# Patient Record
Sex: FEMALE | Race: WHITE | Employment: OTHER | URBAN - METROPOLITAN AREA
[De-identification: names, ages, dates, MRNs, and addresses within clinical notes are randomized per-mention and may not be internally consistent; named-entity substitution may affect disease eponyms.]

---

## 2018-02-01 LAB
ALT SERPL-CCNC: 25 U/L
AST SERPL-CCNC: 20 U/L
BUN BLDV-MCNC: 16 MG/DL
CALCIUM SERPL-MCNC: 9.5 MG/DL
CHLORIDE BLD-SCNC: 101 MMOL/L
CHOLESTEROL, TOTAL: 213 MG/DL
CHOLESTEROL/HDL RATIO: 4.7
CO2: 101 MMOL/L
CREAT SERPL-MCNC: 0.68 MG/DL
GFR CALCULATED: >60
GLUCOSE BLD-MCNC: 103 MG/DL
HDLC SERPL-MCNC: 45 MG/DL (ref 35–70)
LDL CHOLESTEROL CALCULATED: 134 MG/DL (ref 0–160)
POTASSIUM SERPL-SCNC: 4.9 MMOL/L
SODIUM BLD-SCNC: 141 MMOL/L
TRIGL SERPL-MCNC: 168 MG/DL
TSH SERPL DL<=0.05 MIU/L-ACNC: 8 UIU/ML
VLDLC SERPL CALC-MCNC: NORMAL MG/DL

## 2018-02-12 ENCOUNTER — OFFICE VISIT (OUTPATIENT)
Dept: FAMILY MEDICINE CLINIC | Age: 68
End: 2018-02-12
Payer: MEDICARE

## 2018-02-12 VITALS
WEIGHT: 249 LBS | TEMPERATURE: 98.1 F | BODY MASS INDEX: 39.08 KG/M2 | SYSTOLIC BLOOD PRESSURE: 144 MMHG | OXYGEN SATURATION: 96 % | HEART RATE: 80 BPM | HEIGHT: 67 IN | DIASTOLIC BLOOD PRESSURE: 70 MMHG

## 2018-02-12 DIAGNOSIS — M75.101 TEAR OF RIGHT ROTATOR CUFF, UNSPECIFIED TEAR EXTENT: ICD-10-CM

## 2018-02-12 DIAGNOSIS — L98.9 PAINFUL SKIN LESION: ICD-10-CM

## 2018-02-12 DIAGNOSIS — B07.9 VIRAL WARTS, UNSPECIFIED TYPE: ICD-10-CM

## 2018-02-12 DIAGNOSIS — E78.2 MIXED HYPERLIPIDEMIA: Primary | ICD-10-CM

## 2018-02-12 DIAGNOSIS — E03.9 HYPOTHYROIDISM, UNSPECIFIED TYPE: ICD-10-CM

## 2018-02-12 PROCEDURE — 1123F ACP DISCUSS/DSCN MKR DOCD: CPT | Performed by: NURSE PRACTITIONER

## 2018-02-12 PROCEDURE — G8400 PT W/DXA NO RESULTS DOC: HCPCS | Performed by: NURSE PRACTITIONER

## 2018-02-12 PROCEDURE — 4040F PNEUMOC VAC/ADMIN/RCVD: CPT | Performed by: NURSE PRACTITIONER

## 2018-02-12 PROCEDURE — 4004F PT TOBACCO SCREEN RCVD TLK: CPT | Performed by: NURSE PRACTITIONER

## 2018-02-12 PROCEDURE — G8427 DOCREV CUR MEDS BY ELIG CLIN: HCPCS | Performed by: NURSE PRACTITIONER

## 2018-02-12 PROCEDURE — 3017F COLORECTAL CA SCREEN DOC REV: CPT | Performed by: NURSE PRACTITIONER

## 2018-02-12 PROCEDURE — 99203 OFFICE O/P NEW LOW 30 MIN: CPT | Performed by: NURSE PRACTITIONER

## 2018-02-12 PROCEDURE — G8417 CALC BMI ABV UP PARAM F/U: HCPCS | Performed by: NURSE PRACTITIONER

## 2018-02-12 PROCEDURE — 1090F PRES/ABSN URINE INCON ASSESS: CPT | Performed by: NURSE PRACTITIONER

## 2018-02-12 PROCEDURE — G8484 FLU IMMUNIZE NO ADMIN: HCPCS | Performed by: NURSE PRACTITIONER

## 2018-02-12 PROCEDURE — 3014F SCREEN MAMMO DOC REV: CPT | Performed by: NURSE PRACTITIONER

## 2018-02-12 RX ORDER — LEVOTHYROXINE SODIUM 0.05 MG/1
50 TABLET ORAL DAILY
Qty: 30 TABLET | Refills: 3 | Status: SHIPPED | OUTPATIENT
Start: 2018-02-12 | End: 2018-05-14 | Stop reason: SDUPTHER

## 2018-02-12 RX ORDER — ATORVASTATIN CALCIUM 10 MG/1
10 TABLET, FILM COATED ORAL DAILY
Qty: 90 TABLET | Refills: 1 | Status: SHIPPED | OUTPATIENT
Start: 2018-02-12 | End: 2018-05-14 | Stop reason: SDUPTHER

## 2018-02-12 ASSESSMENT — PATIENT HEALTH QUESTIONNAIRE - PHQ9
SUM OF ALL RESPONSES TO PHQ9 QUESTIONS 1 & 2: 0
1. LITTLE INTEREST OR PLEASURE IN DOING THINGS: 0
2. FEELING DOWN, DEPRESSED OR HOPELESS: 0
SUM OF ALL RESPONSES TO PHQ QUESTIONS 1-9: 0

## 2018-02-13 ASSESSMENT — ENCOUNTER SYMPTOMS
BLURRED VISION: 0
VOMITING: 0
NAUSEA: 0
DIARRHEA: 0
DOUBLE VISION: 0
ABDOMINAL PAIN: 0
BACK PAIN: 0
CONSTIPATION: 0
BLOOD IN STOOL: 0
SORE THROAT: 0
ORTHOPNEA: 0
SHORTNESS OF BREATH: 0
COUGH: 0
HEARTBURN: 0

## 2018-03-01 ENCOUNTER — TELEPHONE (OUTPATIENT)
Dept: FAMILY MEDICINE CLINIC | Age: 68
End: 2018-03-01

## 2018-03-01 DIAGNOSIS — R42 VERTIGO: Primary | ICD-10-CM

## 2018-05-03 LAB
BUN BLDV-MCNC: 14 MG/DL
CALCIUM SERPL-MCNC: 9.2 MG/DL
CHLORIDE BLD-SCNC: 104 MMOL/L
CHOLESTEROL, TOTAL: 151 MG/DL
CHOLESTEROL/HDL RATIO: 3.2
CO2: 23 MMOL/L
CREAT SERPL-MCNC: 0.63 MG/DL
GFR CALCULATED: NORMAL
GLUCOSE BLD-MCNC: 104 MG/DL
HDLC SERPL-MCNC: 47 MG/DL (ref 35–70)
LDL CHOLESTEROL CALCULATED: 88 MG/DL (ref 0–160)
POTASSIUM SERPL-SCNC: 4.5 MMOL/L
SODIUM BLD-SCNC: 143 MMOL/L
TRIGL SERPL-MCNC: 80 MG/DL
TSH SERPL DL<=0.05 MIU/L-ACNC: 4.62 UIU/ML
VLDLC SERPL CALC-MCNC: NORMAL MG/DL

## 2018-05-14 ENCOUNTER — OFFICE VISIT (OUTPATIENT)
Dept: FAMILY MEDICINE CLINIC | Age: 68
End: 2018-05-14
Payer: MEDICARE

## 2018-05-14 VITALS
WEIGHT: 255 LBS | HEIGHT: 67 IN | HEART RATE: 83 BPM | DIASTOLIC BLOOD PRESSURE: 76 MMHG | SYSTOLIC BLOOD PRESSURE: 130 MMHG | BODY MASS INDEX: 40.02 KG/M2 | OXYGEN SATURATION: 94 %

## 2018-05-14 DIAGNOSIS — E78.2 MIXED HYPERLIPIDEMIA: Primary | ICD-10-CM

## 2018-05-14 DIAGNOSIS — E03.9 HYPOTHYROIDISM, UNSPECIFIED TYPE: ICD-10-CM

## 2018-05-14 PROBLEM — E05.90 HYPERTHYROIDISM: Status: ACTIVE | Noted: 2018-05-14

## 2018-05-14 PROCEDURE — G8417 CALC BMI ABV UP PARAM F/U: HCPCS | Performed by: NURSE PRACTITIONER

## 2018-05-14 PROCEDURE — G8400 PT W/DXA NO RESULTS DOC: HCPCS | Performed by: NURSE PRACTITIONER

## 2018-05-14 PROCEDURE — G8427 DOCREV CUR MEDS BY ELIG CLIN: HCPCS | Performed by: NURSE PRACTITIONER

## 2018-05-14 PROCEDURE — 1090F PRES/ABSN URINE INCON ASSESS: CPT | Performed by: NURSE PRACTITIONER

## 2018-05-14 PROCEDURE — 1123F ACP DISCUSS/DSCN MKR DOCD: CPT | Performed by: NURSE PRACTITIONER

## 2018-05-14 PROCEDURE — 3017F COLORECTAL CA SCREEN DOC REV: CPT | Performed by: NURSE PRACTITIONER

## 2018-05-14 PROCEDURE — 99214 OFFICE O/P EST MOD 30 MIN: CPT | Performed by: NURSE PRACTITIONER

## 2018-05-14 PROCEDURE — 4040F PNEUMOC VAC/ADMIN/RCVD: CPT | Performed by: NURSE PRACTITIONER

## 2018-05-14 PROCEDURE — 4004F PT TOBACCO SCREEN RCVD TLK: CPT | Performed by: NURSE PRACTITIONER

## 2018-05-14 RX ORDER — ATORVASTATIN CALCIUM 10 MG/1
10 TABLET, FILM COATED ORAL DAILY
Qty: 90 TABLET | Refills: 1 | Status: SHIPPED | OUTPATIENT
Start: 2018-05-14 | End: 2018-09-12 | Stop reason: SDUPTHER

## 2018-05-14 RX ORDER — LEVOTHYROXINE SODIUM 0.05 MG/1
50 TABLET ORAL DAILY
Qty: 90 TABLET | Refills: 1 | Status: SHIPPED | OUTPATIENT
Start: 2018-05-14 | End: 2018-09-12 | Stop reason: SDUPTHER

## 2018-05-17 ASSESSMENT — ENCOUNTER SYMPTOMS
NAUSEA: 0
COUGH: 0
DIARRHEA: 0
SHORTNESS OF BREATH: 0
VOMITING: 0

## 2018-09-12 DIAGNOSIS — E78.2 MIXED HYPERLIPIDEMIA: ICD-10-CM

## 2018-09-12 RX ORDER — LEVOTHYROXINE SODIUM 0.05 MG/1
TABLET ORAL
Qty: 90 TABLET | Refills: 1 | Status: SHIPPED | OUTPATIENT
Start: 2018-09-12 | End: 2018-11-12 | Stop reason: SDUPTHER

## 2018-09-12 RX ORDER — ATORVASTATIN CALCIUM 10 MG/1
TABLET, FILM COATED ORAL
Qty: 90 TABLET | Refills: 1 | Status: SHIPPED | OUTPATIENT
Start: 2018-09-12 | End: 2018-11-12 | Stop reason: SDUPTHER

## 2018-09-22 ENCOUNTER — APPOINTMENT (OUTPATIENT)
Dept: ULTRASOUND IMAGING | Age: 68
End: 2018-09-22
Payer: MEDICARE

## 2018-09-22 ENCOUNTER — APPOINTMENT (OUTPATIENT)
Dept: GENERAL RADIOLOGY | Age: 68
End: 2018-09-22
Payer: MEDICARE

## 2018-09-22 ENCOUNTER — APPOINTMENT (OUTPATIENT)
Dept: CT IMAGING | Age: 68
End: 2018-09-22
Payer: MEDICARE

## 2018-09-22 ENCOUNTER — HOSPITAL ENCOUNTER (EMERGENCY)
Age: 68
Discharge: ANOTHER HOSPITAL | End: 2018-09-23
Attending: INTERNAL MEDICINE
Payer: MEDICARE

## 2018-09-22 DIAGNOSIS — Z72.0 TOBACCO ABUSE: ICD-10-CM

## 2018-09-22 DIAGNOSIS — E86.0 DEHYDRATION: ICD-10-CM

## 2018-09-22 DIAGNOSIS — I10 ESSENTIAL HYPERTENSION: ICD-10-CM

## 2018-09-22 DIAGNOSIS — N39.0 URINARY TRACT INFECTION WITH HEMATURIA, SITE UNSPECIFIED: ICD-10-CM

## 2018-09-22 DIAGNOSIS — K35.80 ACUTE APPENDICITIS, UNSPECIFIED ACUTE APPENDICITIS TYPE: Primary | ICD-10-CM

## 2018-09-22 DIAGNOSIS — R31.9 URINARY TRACT INFECTION WITH HEMATURIA, SITE UNSPECIFIED: ICD-10-CM

## 2018-09-22 LAB
-: ABNORMAL
ABSOLUTE EOS #: 0.1 K/UL (ref 0–0.4)
ABSOLUTE IMMATURE GRANULOCYTE: ABNORMAL K/UL (ref 0–0.3)
ABSOLUTE LYMPH #: 1.1 K/UL (ref 1–4.8)
ABSOLUTE MONO #: 0.3 K/UL (ref 0–1)
ALBUMIN SERPL-MCNC: 4.1 G/DL (ref 3.5–5.2)
ALBUMIN/GLOBULIN RATIO: ABNORMAL (ref 1–2.5)
ALP BLD-CCNC: 76 U/L (ref 35–104)
ALT SERPL-CCNC: 21 U/L (ref 5–33)
AMORPHOUS: ABNORMAL
AMYLASE: 44 U/L (ref 28–100)
ANION GAP SERPL CALCULATED.3IONS-SCNC: 13 MMOL/L (ref 9–17)
AST SERPL-CCNC: 18 U/L
BACTERIA: ABNORMAL
BASOPHILS # BLD: 0 % (ref 0–2)
BASOPHILS ABSOLUTE: 0 K/UL (ref 0–0.2)
BILIRUB SERPL-MCNC: 0.73 MG/DL (ref 0.3–1.2)
BILIRUBIN URINE: NEGATIVE
BUN BLDV-MCNC: 16 MG/DL (ref 8–23)
BUN/CREAT BLD: 25 (ref 9–20)
CALCIUM SERPL-MCNC: 9 MG/DL (ref 8.6–10.4)
CASTS UA: ABNORMAL /LPF
CHLORIDE BLD-SCNC: 104 MMOL/L (ref 98–107)
CO2: 25 MMOL/L (ref 20–31)
COLOR: YELLOW
COMMENT UA: ABNORMAL
CREAT SERPL-MCNC: 0.64 MG/DL (ref 0.5–0.9)
CRYSTALS, UA: ABNORMAL /HPF
DIFFERENTIAL TYPE: YES
DIRECT EXAM: ABNORMAL
EOSINOPHILS RELATIVE PERCENT: 1 % (ref 0–5)
EPITHELIAL CELLS UA: ABNORMAL /HPF
GFR AFRICAN AMERICAN: >60 ML/MIN
GFR NON-AFRICAN AMERICAN: >60 ML/MIN
GFR SERPL CREATININE-BSD FRML MDRD: ABNORMAL ML/MIN/{1.73_M2}
GFR SERPL CREATININE-BSD FRML MDRD: ABNORMAL ML/MIN/{1.73_M2}
GLUCOSE BLD-MCNC: 118 MG/DL (ref 70–99)
GLUCOSE URINE: NEGATIVE
HCT VFR BLD CALC: 40.6 % (ref 36–46)
HEMOGLOBIN: 13.6 G/DL (ref 12–16)
IMMATURE GRANULOCYTES: ABNORMAL %
KETONES, URINE: ABNORMAL
LACTIC ACID, SEPSIS WHOLE BLOOD: NORMAL MMOL/L (ref 0.5–1.9)
LACTIC ACID, SEPSIS: 1.5 MMOL/L (ref 0.5–1.9)
LEUKOCYTE ESTERASE, URINE: ABNORMAL
LIPASE: 17 U/L (ref 13–60)
LYMPHOCYTES # BLD: 9 % (ref 15–40)
Lab: ABNORMAL
MCH RBC QN AUTO: 30.8 PG (ref 26–34)
MCHC RBC AUTO-ENTMCNC: 33.5 G/DL (ref 31–37)
MCV RBC AUTO: 91.8 FL (ref 80–100)
MONOCYTES # BLD: 2 % (ref 4–8)
MUCUS: ABNORMAL
NITRITE, URINE: NEGATIVE
NRBC AUTOMATED: ABNORMAL PER 100 WBC
OTHER OBSERVATIONS UA: ABNORMAL
PDW BLD-RTO: 14.1 % (ref 12.1–15.2)
PH UA: 5 (ref 5–8)
PLATELET # BLD: 308 K/UL (ref 140–450)
PLATELET ESTIMATE: ABNORMAL
PMV BLD AUTO: ABNORMAL FL (ref 6–12)
POTASSIUM SERPL-SCNC: 3.6 MMOL/L (ref 3.7–5.3)
PROTEIN UA: ABNORMAL
RBC # BLD: 4.43 M/UL (ref 4–5.2)
RBC # BLD: ABNORMAL 10*6/UL
RBC UA: ABNORMAL /HPF (ref 0–2)
RENAL EPITHELIAL, UA: ABNORMAL /HPF
SEG NEUTROPHILS: 88 % (ref 47–75)
SEGMENTED NEUTROPHILS ABSOLUTE COUNT: 10.6 K/UL (ref 2.5–7)
SODIUM BLD-SCNC: 142 MMOL/L (ref 135–144)
SPECIFIC GRAVITY UA: 1.02 (ref 1–1.03)
SPECIMEN DESCRIPTION: ABNORMAL
STATUS: ABNORMAL
TOTAL PROTEIN: 7.3 G/DL (ref 6.4–8.3)
TRICHOMONAS: ABNORMAL
TURBIDITY: ABNORMAL
URINE HGB: ABNORMAL
UROBILINOGEN, URINE: NORMAL
WBC # BLD: 12.1 K/UL (ref 3.5–11)
WBC # BLD: ABNORMAL 10*3/UL
WBC UA: ABNORMAL /HPF
YEAST: ABNORMAL

## 2018-09-22 PROCEDURE — 87015 SPECIMEN INFECT AGNT CONCNTJ: CPT

## 2018-09-22 PROCEDURE — 6360000004 HC RX CONTRAST MEDICATION: Performed by: INTERNAL MEDICINE

## 2018-09-22 PROCEDURE — 99285 EMERGENCY DEPT VISIT HI MDM: CPT

## 2018-09-22 PROCEDURE — 96365 THER/PROPH/DIAG IV INF INIT: CPT

## 2018-09-22 PROCEDURE — 71045 X-RAY EXAM CHEST 1 VIEW: CPT

## 2018-09-22 PROCEDURE — 6370000000 HC RX 637 (ALT 250 FOR IP): Performed by: INTERNAL MEDICINE

## 2018-09-22 PROCEDURE — 80053 COMPREHEN METABOLIC PANEL: CPT

## 2018-09-22 PROCEDURE — 85025 COMPLETE CBC W/AUTO DIFF WBC: CPT

## 2018-09-22 PROCEDURE — 87040 BLOOD CULTURE FOR BACTERIA: CPT

## 2018-09-22 PROCEDURE — 87252 VIRUS INOCULATION TISSUE: CPT

## 2018-09-22 PROCEDURE — 96361 HYDRATE IV INFUSION ADD-ON: CPT

## 2018-09-22 PROCEDURE — 94664 DEMO&/EVAL PT USE INHALER: CPT

## 2018-09-22 PROCEDURE — 81001 URINALYSIS AUTO W/SCOPE: CPT

## 2018-09-22 PROCEDURE — 94640 AIRWAY INHALATION TREATMENT: CPT

## 2018-09-22 PROCEDURE — 87210 SMEAR WET MOUNT SALINE/INK: CPT

## 2018-09-22 PROCEDURE — 6360000002 HC RX W HCPCS: Performed by: INTERNAL MEDICINE

## 2018-09-22 PROCEDURE — 82150 ASSAY OF AMYLASE: CPT

## 2018-09-22 PROCEDURE — 2580000003 HC RX 258: Performed by: INTERNAL MEDICINE

## 2018-09-22 PROCEDURE — 83690 ASSAY OF LIPASE: CPT

## 2018-09-22 PROCEDURE — 87255 GENET VIRUS ISOLATE HSV: CPT

## 2018-09-22 PROCEDURE — 83605 ASSAY OF LACTIC ACID: CPT

## 2018-09-22 PROCEDURE — 36415 COLL VENOUS BLD VENIPUNCTURE: CPT

## 2018-09-22 PROCEDURE — 87591 N.GONORRHOEAE DNA AMP PROB: CPT

## 2018-09-22 PROCEDURE — 74177 CT ABD & PELVIS W/CONTRAST: CPT

## 2018-09-22 PROCEDURE — 87086 URINE CULTURE/COLONY COUNT: CPT

## 2018-09-22 RX ORDER — IPRATROPIUM BROMIDE AND ALBUTEROL SULFATE 2.5; .5 MG/3ML; MG/3ML
1 SOLUTION RESPIRATORY (INHALATION) ONCE
Status: COMPLETED | OUTPATIENT
Start: 2018-09-22 | End: 2018-09-22

## 2018-09-22 RX ORDER — ACETAMINOPHEN 325 MG/1
650 TABLET ORAL ONCE
Status: COMPLETED | OUTPATIENT
Start: 2018-09-22 | End: 2018-09-22

## 2018-09-22 RX ORDER — 0.9 % SODIUM CHLORIDE 0.9 %
1000 INTRAVENOUS SOLUTION INTRAVENOUS ONCE
Status: COMPLETED | OUTPATIENT
Start: 2018-09-22 | End: 2018-09-22

## 2018-09-22 RX ADMIN — PIPERACILLIN SODIUM, TAZOBACTAM SODIUM 3.38 G: 3; .375 INJECTION, POWDER, LYOPHILIZED, FOR SOLUTION INTRAVENOUS at 22:46

## 2018-09-22 RX ADMIN — IOPAMIDOL 75 ML: 755 INJECTION, SOLUTION INTRAVENOUS at 20:02

## 2018-09-22 RX ADMIN — IPRATROPIUM BROMIDE AND ALBUTEROL SULFATE 1 AMPULE: .5; 3 SOLUTION RESPIRATORY (INHALATION) at 23:38

## 2018-09-22 RX ADMIN — ACETAMINOPHEN 650 MG: 325 TABLET, FILM COATED ORAL at 22:45

## 2018-09-22 RX ADMIN — SODIUM CHLORIDE 1000 ML: 9 INJECTION, SOLUTION INTRAVENOUS at 22:03

## 2018-09-22 ASSESSMENT — PAIN DESCRIPTION - PAIN TYPE: TYPE: ACUTE PAIN

## 2018-09-22 ASSESSMENT — PAIN SCALES - GENERAL
PAINLEVEL_OUTOF10: 5
PAINLEVEL_OUTOF10: 3

## 2018-09-22 ASSESSMENT — PAIN DESCRIPTION - ORIENTATION: ORIENTATION: LOWER

## 2018-09-22 ASSESSMENT — PAIN DESCRIPTION - DESCRIPTORS: DESCRIPTORS: PRESSURE

## 2018-09-22 ASSESSMENT — PAIN DESCRIPTION - LOCATION: LOCATION: ABDOMEN

## 2018-09-22 NOTE — ED PROVIDER NOTES
[facility] ED  eMERGENCY dEPARTMENT eNCOUnter      Pt Name: Dat Singletary  MRN: [id number]  [place] 1950  Date of evaluation: 9/22/2018  Provider: Yocasta Hoffmann MD    [address] [place] [address]       Chief Complaint   Patient presents with    Abdominal Pain     lower abd pain started this am         HISTORY OF PRESENT ILLNESS   (Location/Symptom, Timing/Onset, Context/Setting, Quality, Duration, Modifying Factors, Severity)  Note limiting factors. Dat Singletary is a 78 y.o. female who presents to the emergency department For evaluation and management of lower abdominal pain. This all started today and it is described as crampy. There is no vomiting or diarrhea. She has had intermittent nausea. Her last meal was one day ago. She had some sense of water on her way here. Patient reports that she had a hysterectomy performed in 2006 with mesh placement for bladder suspension. She  has had irritating vaginal discharge that syarted one year agoBut has not seen a gynecologist for it. A colonoscopy was performed 10 years ago and polyps were removed. She has had a foul vaginal discharge for one year causing a rash in her perineal region. Last sensation of a gynecologist was 4 years ago. She has not been seen by any other providers for this. HPI    Nursing Notes were reviewed. REVIEW OF SYSTEMS    (2-9 systems for level 4, 10 or more for level 5)       REVIEW OF SYSTEMS    Constitutional: Negative for fatigue and fever. Respiratory: Negative for cough, chest tightness and shortness of breath. Cardiovascular: Negative for chest pain, palpitations and leg swelling. Gastrointestinal: Positive for Crampy low abdominal pain,, Negative for abdominal distention, diarrhea, nausea and vomiting. Endocrine: Negative for cold intolerance, heat intolerance, polydipsia, polyphagia and polyuria. Genitourinary: Positive for one year of vaginal discharge, tender rash in the perineum.  Negative for difficulty PHYSICAL EXAM    (up to 7 for level 4, 8 or more for level 5)     ED Triage Vitals   BP Temp Temp src Pulse Resp SpO2 Height Weight   -- -- -- -- -- -- -- --       Physical Exam  Physical Exam   Constitutional:  Appears Well, well-developed and well-nourished. No distress noted. Non toxic in appearance. Morbidly obese. HENT:     Head: Normocephalic and atraumatic. Right Ear: External ear normal.    Left Ear: External ear normal.    Nose: Nose normal.     Mouth/Throat: Oropharynx is clear and mucosa moist. No oropharyngeal exudate noted. Eyes: Conjunctivae and EOM are normal. Pupils are equal, round, and reactive to light. No scleral icterus. Neck: Normal range of motion. Neck supple. No tracheal deviation present. Cardiovascular: Normal rate, regular rhythm, normal heart sounds and intact distal pulses. Exam reveals no gallop or friction rub. No murmur heard. Pulmonary/Chest: Effort normal and breath sounds are symmetric. No respiratory distress. There are diffuse wheezes, and rhonchi in both lungs but no rales. No tenderness is exhibited upon palpation of the chest wall. Abdominal: Soft. Bowel sounds are diminished. No distension or no mass exhibitted. There is diffuse mild to moderate lower abdominal tenderness, without rebound, rigidity or guarding. Genitourinary:   No CVA tenderness external genitalia demonstrates a palpable papular rash with central clearing. There is a thick white discharge in the vaginal vault. There are hard gray foreign bodies identified in the posterior vaginal vault bilaterally. Musculoskeletal: Normal range of motion. No edema, tenderness or deformity. Lymphadenopathy:  No cervical adenopathy. Neurological:   alert and oriented to person, place, and time. Reflexes are normal.  There are no cranial nerve deficits. Normal muscle tone, motor and sensory function exhibitted. Coordination and gait normal.   Skin: Skin is warm and dry. No rash noted.  No diaphoresis. No erythema. No pallor. Psychiatric:  Pleasant and cooperative, normal mood and affect. Behavior is  normal. Judgment and thought content normal.     DIAGNOSTIC RESULTS     EKG: All EKG's are interpreted by the Emergency Department Physician who either signs or Co-signs this chart in the absence of a cardiologist.    Not indicated    RADIOLOGY:   Non-plain film images such as CT, Ultrasound and MRI are read by the radiologist.     Interpretation per the Radiologist below, if available at the time of this note:    CT ABDOMEN PELVIS W IV CONTRAST Additional Contrast? None   Final Result      1. Retrocecal appendix with acute appendicitis. The appendix measures up to 1.2    cm in diameter with surrounding fat inflammation. No signs of perforation or    abscess. A trace volume of pelvic free fluid is likely reactive. 2. Mild hepatomegaly with moderate diffuse steatosis. 3. Moderate left colonic diverticulosis without signs of acute diverticulitis. 4. Possible punctate nonobstructing left renal stone. 5. Hysterectomy. results were called by Dr. Nicole Mc DO  to  Dr. Matias Salazar  At    9/22/2018 9:00 PM EDT. XR CHEST PORTABLE   Final Result      1. Mild bibasilar atelectasis. 2. Otherwise no acute cardiopulmonary process.                   ED BEDSIDE ULTRASOUND:   Performed by ED Physician - none    LABS:  Labs Reviewed   WET PREP, GENITAL - Abnormal; Notable for the following:        Result Value    Direct Exam RARE WBC (*)     Direct Exam RARE EPITHELIAL CELLS (*)     Direct Exam RARE BACTERIA (*)     All other components within normal limits   COMPREHENSIVE METABOLIC PANEL W/ REFLEX TO MG FOR LOW K - Abnormal; Notable for the following:     Glucose 118 (*)     Bun/Cre Ratio 25 (*)     Potassium 3.6 (*)     All other components within normal limits   CBC WITH AUTO DIFFERENTIAL - Abnormal; Notable for the following:     WBC 12.1 (*)     Seg Neutrophils 88 (*)     Lymphocytes 9 (*)     Monocytes 2 (*)     Segs Absolute 10.60 (*)     All other components within normal limits   URINALYSIS - Abnormal; Notable for the following:     Turbidity UA HAZY (*)     Ketones, Urine TRACE (*)     Urine Hgb 2+ (*)     Protein, UA 1+ (*)     Leukocyte Esterase, Urine 3+ (*)     All other components within normal limits   MICROSCOPIC URINALYSIS - Abnormal; Notable for the following:     Bacteria, UA 1+ (*)     Mucus, UA 1+ (*)     All other components within normal limits   NEISSERIA GONORRHOEAE DNA, URINE   URINE CULTURE   CULTURE BLOOD #2   CULTURE BLOOD #1   LIPASE   AMYLASE   LACTATE, SEPSIS   LACTATE, SEPSIS   NON-RESPIRATORY VIRAL CULTURE       All other labs were within normal range or not returned as of this dictation. EMERGENCY DEPARTMENT COURSE and DIFFERENTIAL DIAGNOSIS/MDM:   Vitals:    Vitals:    09/23/18 0140 09/23/18 0149 09/23/18 0204 09/23/18 0257   BP: (!) 125/44 (!) 134/50 (!) 124/50 (!) 123/54   Pulse: 80   77   Resp:    20   Temp: 99 °F (37.2 °C)      TempSrc: Oral      SpO2: 95% 94% 93% 96%   Weight:       Height:           Noted    MDM    CRITICAL CARE TIME   Total Critical Care time was 0 minutes, excluding separately reportable procedures. Tiffani Blount  ED Course as of Sep 23 0540   Sat Sep 22, 2018   2058 Radiologist Dr. Rosy Cheatham contacted me to explain the CT results which included acute appendicitis in the retrocecal position. It is 1.2 cm at the widest dimension. It is fluid-filled with inflammatory stranding. There is no evidence of perforation. There is trace free fluid in the pelvis. [MS]   2110 Call placed to Dr. Tong Jacobsen. I discussed this patient's appendicitis and he will call the house supervisor to discuss the case and plan for surgery. [MS]   2115 I again spoke with Dr. Tong Jacobsen and explained to him this patient's other finding and problem with pelvic mesh.  He stated that he was concerned because he has very limited assistance in [place] due to the complexity of the situation to avoid multiple transfers. I told Kdgerri Croftbri that I would speak with the patient. [MS]   9970 Patient states that she is willing to go to [facility]. [facility] contacted to let them know. [MS]   3213 I spoke with Rachele Kat at the transfer center who spoke with the gynecologist Dr. Amita Christian, who stated that vaginal mesh would be removed as an outpatient procedure. She also spoke with the general surgeon who would gladly accept the patient for transfer for appendectomy. Patient is to be sent to the emergency department, accepting is Dr. Shelly Hall. She would like the chart and CD of images sent over. [MS]   8716 Spoke with Dr. Loulou Collazo who is a general surgeon at [facility] in [place] and she stated that she would gladly accept the patient for an appendectomy but there are no resources available to handle the problem with the mesh. Commended the patient be transferred to another facility with the ability to address this concern. [MS]   Sun Sep 23, 2018   0123 Call placed to [facility] to contact [facility]. [MS]   4604 Pending from the access Center called to state that she contacted was*who stated that the ET was 4:15 this morning. She reported that the ED contacted them at 2359 at which time [facility] was contacted. Angelica are then contacted [facility] to confirm the bed assignment. She had also contacted Medicare, but no other services are available to transport this patient in the Center. She asked if the MICU transport would be appropriate and I stated to her that at this time I believed it was appropriate to initiate MICU transport. [MS]   0131 I spoke to Peg at the access Center again and she stated the [facility] head no MICU available for transport. [MS]   0158 Patient is clinically improved and laboratory vital signs show interval improvement.  We'll continue to monitor closely for any evidence of deterioration at which time she Physician            Yocasta Hoffmann MD  09/23/18 4861

## 2018-09-23 VITALS
WEIGHT: 247.5 LBS | HEIGHT: 67 IN | DIASTOLIC BLOOD PRESSURE: 54 MMHG | SYSTOLIC BLOOD PRESSURE: 123 MMHG | HEART RATE: 77 BPM | RESPIRATION RATE: 20 BRPM | OXYGEN SATURATION: 96 % | TEMPERATURE: 99 F | BODY MASS INDEX: 38.84 KG/M2

## 2018-09-23 LAB
-: NORMAL
CULTURE: NORMAL
LACTIC ACID, SEPSIS WHOLE BLOOD: NORMAL MMOL/L (ref 0.5–1.9)
LACTIC ACID, SEPSIS: 0.9 MMOL/L (ref 0.5–1.9)
Lab: NORMAL
Lab: NORMAL
REASON FOR REJECTION: NORMAL
SPECIMEN DESCRIPTION: NORMAL
SPECIMEN DESCRIPTION: NORMAL
STATUS: NORMAL
STATUS: NORMAL
ZZ NTE CLEAN UP: ORDERED TEST: NORMAL
ZZ NTE WITH NAME CLEAN UP: SPECIMEN SOURCE: NORMAL

## 2018-09-23 PROCEDURE — 83605 ASSAY OF LACTIC ACID: CPT

## 2018-09-23 PROCEDURE — 96361 HYDRATE IV INFUSION ADD-ON: CPT

## 2018-09-23 PROCEDURE — 36415 COLL VENOUS BLD VENIPUNCTURE: CPT

## 2018-09-23 PROCEDURE — 2580000003 HC RX 258: Performed by: INTERNAL MEDICINE

## 2018-09-23 RX ORDER — 0.9 % SODIUM CHLORIDE 0.9 %
1000 INTRAVENOUS SOLUTION INTRAVENOUS ONCE
Status: COMPLETED | OUTPATIENT
Start: 2018-09-23 | End: 2018-09-23

## 2018-09-23 RX ADMIN — SODIUM CHLORIDE 1000 ML: 9 INJECTION, SOLUTION INTRAVENOUS at 01:17

## 2018-09-23 ASSESSMENT — PAIN DESCRIPTION - DESCRIPTORS: DESCRIPTORS: CRAMPING

## 2018-09-23 ASSESSMENT — PAIN DESCRIPTION - PAIN TYPE: TYPE: ACUTE PAIN

## 2018-09-23 ASSESSMENT — PAIN DESCRIPTION - PROGRESSION: CLINICAL_PROGRESSION: NOT CHANGED

## 2018-09-23 ASSESSMENT — PAIN DESCRIPTION - ORIENTATION: ORIENTATION: RIGHT;LOWER

## 2018-09-23 ASSESSMENT — PAIN SCALES - WONG BAKER: WONGBAKER_NUMERICALRESPONSE: 0

## 2018-09-23 ASSESSMENT — PAIN DESCRIPTION - FREQUENCY: FREQUENCY: CONTINUOUS

## 2018-09-23 ASSESSMENT — PAIN SCALES - GENERAL: PAINLEVEL_OUTOF10: 8

## 2018-09-23 ASSESSMENT — PAIN DESCRIPTION - ONSET: ONSET: GRADUAL

## 2018-09-23 ASSESSMENT — PAIN DESCRIPTION - LOCATION: LOCATION: ABDOMEN

## 2018-09-23 NOTE — ED NOTES
Pt and family updated on transfer to [facility]. Pt and family request transfer to [facility] do to location and family issues occurring at this time.       Gianni Reyes RN  09/22/18 2673

## 2018-09-25 LAB — N. GONORRHOEAE DNA, URINE: NEGATIVE

## 2018-09-26 LAB
CULTURE: NORMAL
CULTURE: NORMAL
Lab: NORMAL
SPECIMEN DESCRIPTION: NORMAL
STATUS: NORMAL

## 2018-09-28 LAB
CULTURE: NORMAL
CULTURE: NORMAL
Lab: NORMAL
Lab: NORMAL
SPECIMEN DESCRIPTION: NORMAL
SPECIMEN DESCRIPTION: NORMAL
STATUS: NORMAL
STATUS: NORMAL

## 2018-11-12 ENCOUNTER — OFFICE VISIT (OUTPATIENT)
Dept: FAMILY MEDICINE CLINIC | Age: 68
End: 2018-11-12
Payer: MEDICARE

## 2018-11-12 VITALS
WEIGHT: 246 LBS | OXYGEN SATURATION: 97 % | DIASTOLIC BLOOD PRESSURE: 80 MMHG | TEMPERATURE: 97.8 F | BODY MASS INDEX: 37.28 KG/M2 | SYSTOLIC BLOOD PRESSURE: 136 MMHG | HEART RATE: 76 BPM | HEIGHT: 68 IN

## 2018-11-12 DIAGNOSIS — E78.2 MIXED HYPERLIPIDEMIA: ICD-10-CM

## 2018-11-12 DIAGNOSIS — E03.9 ACQUIRED HYPOTHYROIDISM: ICD-10-CM

## 2018-11-12 PROBLEM — E05.90 HYPERTHYROIDISM: Status: RESOLVED | Noted: 2018-05-14 | Resolved: 2018-11-12

## 2018-11-12 PROCEDURE — 3017F COLORECTAL CA SCREEN DOC REV: CPT | Performed by: NURSE PRACTITIONER

## 2018-11-12 PROCEDURE — 4004F PT TOBACCO SCREEN RCVD TLK: CPT | Performed by: NURSE PRACTITIONER

## 2018-11-12 PROCEDURE — G8427 DOCREV CUR MEDS BY ELIG CLIN: HCPCS | Performed by: NURSE PRACTITIONER

## 2018-11-12 PROCEDURE — 99214 OFFICE O/P EST MOD 30 MIN: CPT | Performed by: NURSE PRACTITIONER

## 2018-11-12 PROCEDURE — G8484 FLU IMMUNIZE NO ADMIN: HCPCS | Performed by: NURSE PRACTITIONER

## 2018-11-12 PROCEDURE — 1090F PRES/ABSN URINE INCON ASSESS: CPT | Performed by: NURSE PRACTITIONER

## 2018-11-12 PROCEDURE — 1101F PT FALLS ASSESS-DOCD LE1/YR: CPT | Performed by: NURSE PRACTITIONER

## 2018-11-12 PROCEDURE — G8417 CALC BMI ABV UP PARAM F/U: HCPCS | Performed by: NURSE PRACTITIONER

## 2018-11-12 PROCEDURE — 4040F PNEUMOC VAC/ADMIN/RCVD: CPT | Performed by: NURSE PRACTITIONER

## 2018-11-12 PROCEDURE — 1123F ACP DISCUSS/DSCN MKR DOCD: CPT | Performed by: NURSE PRACTITIONER

## 2018-11-12 PROCEDURE — G8400 PT W/DXA NO RESULTS DOC: HCPCS | Performed by: NURSE PRACTITIONER

## 2018-11-12 RX ORDER — LEVOTHYROXINE SODIUM 0.05 MG/1
TABLET ORAL
Qty: 90 TABLET | Refills: 1 | Status: SHIPPED | OUTPATIENT
Start: 2018-11-12 | End: 2019-09-04 | Stop reason: SDUPTHER

## 2018-11-12 RX ORDER — ATORVASTATIN CALCIUM 10 MG/1
TABLET, FILM COATED ORAL
Qty: 90 TABLET | Refills: 1 | Status: SHIPPED | OUTPATIENT
Start: 2018-11-12 | End: 2019-09-04 | Stop reason: SDUPTHER

## 2018-11-12 ASSESSMENT — ENCOUNTER SYMPTOMS
SORE THROAT: 0
CONSTIPATION: 0
BACK PAIN: 0
DIARRHEA: 0
BLOOD IN STOOL: 0
COUGH: 0
VOMITING: 0
SHORTNESS OF BREATH: 0
NAUSEA: 0
ABDOMINAL PAIN: 0

## 2018-11-12 NOTE — PROGRESS NOTES
Admission medications    Medication Sig Start Date End Date Taking? Authorizing Provider   levothyroxine (SYNTHROID) 50 MCG tablet TAKE 1 TABLET EVERY DAY 11/12/18  Yes SELENE Peacock CNP   atorvastatin (LIPITOR) 10 MG tablet TAKE 1 TABLET EVERY DAY 11/12/18  Yes SELENE Peacock CNP        Allergies:       Patient has no known allergies. Social History:     Tobacco:    reports that she has been smoking. She has a 30.00 pack-year smoking history. She has never used smokeless tobacco.  Alcohol:      reports that she does not drink alcohol. Drug Use:  reports that she does not use drugs. Family History:     Family History   Problem Relation Age of Onset    Stroke Maternal Grandmother     Stroke Paternal Grandmother        Review of Systems:         Review of Systems   Constitutional: Negative for chills and fever. HENT: Negative for sore throat. Respiratory: Negative for cough and shortness of breath. Cardiovascular: Negative for chest pain, palpitations and leg swelling. Gastrointestinal: Negative for abdominal pain, blood in stool, constipation, diarrhea, nausea and vomiting. Genitourinary: Negative for dysuria, frequency and hematuria. Musculoskeletal: Negative for back pain and neck pain. Skin: Negative for rash. Neurological: Negative for dizziness, tremors, seizures, weakness and headaches. Hematological: Does not bruise/bleed easily. Psychiatric/Behavioral: Negative for suicidal ideas. The patient is not nervous/anxious. Physical Exam:     Vitals:  /80   Pulse 76   Temp 97.8 °F (36.6 °C) (Oral)   Ht 5' 8\" (1.727 m)   Wt 246 lb (111.6 kg)   SpO2 97%   BMI 37.40 kg/m²       Physical Exam   Constitutional: She is oriented to person, place, and time. Vital signs are normal. She appears well-developed and well-nourished. HENT:   Head: Normocephalic.    Right Ear: Hearing, tympanic membrane and external ear normal.   Left Ear: Hearing, tablet 90 tablet 1     Sig: TAKE 1 TABLET EVERY DAY    atorvastatin (LIPITOR) 10 MG tablet 90 tablet 1     Sig: TAKE 1 TABLET EVERY DAY         Radha Horton received counseling on the following healthy behaviors: nutrition, exercise, medication adherence and tobacco cessation  Reviewed prior labs and health maintenance. Continue current medications, diet and exercise. Discussed use, benefit, and side effects of prescribed medications. Barriers to medication compliance addressed. Patient given educational materials - see patient instructions. All patient questions answered. Patient voiced understanding.

## 2018-11-28 ENCOUNTER — HOSPITAL ENCOUNTER (OUTPATIENT)
Age: 68
Setting detail: SPECIMEN
Discharge: HOME | End: 2018-11-28
Payer: MEDICARE

## 2018-11-28 ENCOUNTER — OFFICE VISIT (OUTPATIENT)
Dept: FAMILY MEDICINE CLINIC | Age: 68
End: 2018-11-28
Payer: MEDICARE

## 2018-11-28 VITALS
WEIGHT: 246 LBS | DIASTOLIC BLOOD PRESSURE: 70 MMHG | SYSTOLIC BLOOD PRESSURE: 120 MMHG | BODY MASS INDEX: 37.28 KG/M2 | HEIGHT: 68 IN

## 2018-11-28 DIAGNOSIS — Z23 NEED FOR VACCINATION WITH 13-POLYVALENT PNEUMOCOCCAL CONJUGATE VACCINE: ICD-10-CM

## 2018-11-28 DIAGNOSIS — Z12.11 SCREENING FOR COLORECTAL CANCER: ICD-10-CM

## 2018-11-28 DIAGNOSIS — Z12.12 SCREENING FOR COLORECTAL CANCER: ICD-10-CM

## 2018-11-28 DIAGNOSIS — D48.5 NEOPLASM OF UNCERTAIN BEHAVIOR OF SKIN OF FACE: Primary | ICD-10-CM

## 2018-11-28 PROCEDURE — 99999 PR OFFICE/OUTPT VISIT,PROCEDURE ONLY: CPT | Performed by: FAMILY MEDICINE

## 2018-11-28 PROCEDURE — G0009 ADMIN PNEUMOCOCCAL VACCINE: HCPCS | Performed by: FAMILY MEDICINE

## 2018-11-28 PROCEDURE — 11100 PR SHAV SKIN LES 6-10MM FACE,FACIAL: CPT | Performed by: FAMILY MEDICINE

## 2018-11-28 PROCEDURE — 90670 PCV13 VACCINE IM: CPT | Performed by: FAMILY MEDICINE

## 2018-11-28 PROCEDURE — 88305 TISSUE EXAM BY PATHOLOGIST: CPT

## 2018-11-28 NOTE — PATIENT INSTRUCTIONS
closed. Exercise 3  While sitting on the side of the bed:  · Shrug your shoulders straight upward, then relax them. · Bend over and try to touch the ground with your fingers. Then go back to a sitting position. · Toss a small ball from one hand to the other. Throw the ball higher than your eyes so you have to look up. Exercise 4  While standing (with someone close by if you feel uncomfortable):  · Repeat Exercise 1.  · Repeat Exercise 2.  · Pass a ball between your legs and above your head. · Sit down and then stand up. Repeat. Turn around in a [place] a different way each time you stand. · With someone close by to help you, try the above exercises with your eyes closed. Exercise 5  In a room that is cleared of obstacles:  · Walk to a corner of the room, turn to your right, and walk back to the starting point. Now, repeat and turn left. · Walk up and down a slope. Now try stairs. · While holding on to someone's arm, try these exercises with your eyes closed. When should you call for help? Watch closely for changes in your health, and be sure to contact your doctor if:    · You do not get better as expected. Where can you learn more? Go to https://EvtronpeSuncoreeb.Glasshouse International. org and sign in to your Zondle account. Enter U300 in the [employer] box to learn more about \"Cawthorne Exercises for Vertigo: Care Instructions. \"     If you do not have an account, please click on the \"Sign Up Now\" link. Current as of: March 28, 2018  Content Version: 11.8  © 2618-0832 Healthwise, Incorporated. Care instructions adapted under license by [facility] ([facility]). If you have questions about a medical condition or this instruction, always ask your healthcare professional. [place] [address] any warranty or liability for your use of this information.

## 2018-11-28 NOTE — PROGRESS NOTES
alcohol. Nonadherent fenestrated drape applied. Sterile gloves applied. Derma blade used to remove lesion with 1 mm margins on each side. Sent for pathology. Hyfrecator for hemostasis. Bacitracin and bandage applied. Instructed in wound care. Assessment & Plan:        Diagnosis Orders   1. Neoplasm of uncertain behavior of skin of face  Dermatology Pathology    81056 - IN SHAV SKIN LES 6-10MM FACE,FACIAL   2. Screening for colorectal cancer  POCT Fecal Immunochemical Test (FIT)   3. Need for vaccination with 13-polyvalent pneumococcal conjugate vaccine  Pneumococcal conjugate vaccine 13-valent    growing lesion, mild asymmetry. Shave biopsy performed. Also briefly discussed essential tremor of head, vertigo. Vertigo exercises given as below. Requested Prescriptions      No prescriptions requested or ordered in this encounter         Patient Instructions       Patient Education        Epley Maneuver at Home for Vertigo: Exercises  Your Care Instructions  Vertigo is a spinning or whirling sensation when you move your head. Your doctor may have moved you in different positions to help your vertigo get better faster. This is called the Epley maneuver. Your doctor also may have asked you to do these exercises at home. Do the exercises as often as your doctor recommends. If your vertigo is getting worse, your doctor may have you change the exercise or stop it. Step 1  Step 1    1. Sit on the edge of a bed or sofa. Step 2    1. Turn your head 45 degrees in the direction your doctor told you to. This should be toward the ear that causes the most vertigo for you. In this picture, the woman is turning toward her left ear. Step 3    1. Tilt yourself backward until you are lying on your back. Your head should still be at a 45-degree turn. Your head should be about midway between looking straight ahead and looking out to your side. Hold for 30 seconds.  If you have vertigo, stay in this position until it stops.    Step 4    1. Turn your head 90 degrees toward the ear that has the least vertigo. In this picture, the woman is turning to the right because she has vertigo on her left side. The point of your chin should be raised and over your shoulder. Hold for 30 seconds. Step 5    1. Roll onto the side with the least vertigo. You should now be looking at the floor. Hold for 30 seconds. Follow-up care is a key part of your treatment and safety. Be sure to make and go to all appointments, and call your doctor if you are having problems. It's also a good idea to know your test results and keep a list of the medicines you take. Where can you learn more? Go to https://Movelinepepiceweb.GenerationStation. org and sign in to your Appboy account. Enter W980 in the CAPNIA box to learn more about \"Epley Maneuver at Home for Vertigo: Exercises. \"     If you do not have an account, please click on the \"Sign Up Now\" link. Current as of: June 4, 2018  Content Version: 11.8  © 6325-7035 Nextpeer. Care instructions adapted under license by [facility] ([facility]). If you have questions about a medical condition or this instruction, always ask your healthcare professional. [address] any warranty or liability for your use of this information. Patient Education        Cawthorne Exercises for Vertigo: Care Instructions  Your Care Instructions  Simple exercises can help you regain your balance when you have vertigo. If you have Ménière's disease, benign paroxysmal positional vertigo (BPPV), or another inner ear problem, you may have vertigo off and on. Do these exercises first thing in the morning and before you go to bed. You might get dizzy when you first start them. If this happens, try to do them for at least 5 minutes.  Do a group of exercises at a time, starting at the top of the list. It may take several weeks before you can do all the exercises without feeling sure to contact your doctor if:    · You do not get better as expected. Where can you learn more? Go to https://chpepiceweb.healthSMGBB. org and sign in to your Be-Bound account. Enter Z552 in the [facility] box to learn more about \"Cawthorne Exercises for Vertigo: Care Instructions. \"     If you do not have an account, please click on the \"Sign Up Now\" link. Current as of: March 28, 2018  Content Version: 11.8  © 5913-8769 Healthwise, Incorporated. Care instructions adapted under license by [facility] ([facility]). If you have questions about a medical condition or this instruction, always ask your healthcare professional. [place] [address] any warranty or liability for your use of this information. Maryjanedarrick Liang received counseling on the following healthy behaviors: wound care  Reviewed prior labs and health maintenance. Continue current medications, diet and exercise. Discussed use, benefit, and side effects of prescribed medications. Barriers to medication compliance addressed. Patient given educational materials - see patient instructions. All patient questions answered. Patient voiced understanding.        Electronically signed by Shalonda Fields DO on 11/28/2018 at 1:23 PM  [address]  [address] [place] [id number]  Dept: [phone]

## 2018-11-29 DIAGNOSIS — Z12.12 SCREENING FOR COLORECTAL CANCER: ICD-10-CM

## 2018-11-29 DIAGNOSIS — Z12.11 SCREENING FOR COLORECTAL CANCER: ICD-10-CM

## 2018-11-29 LAB
CONTROL: PRESENT
DERMATOLOGY PATHOLOGY REPORT: NORMAL
HEMOCCULT STL QL: NORMAL

## 2018-11-29 PROCEDURE — 82274 ASSAY TEST FOR BLOOD FECAL: CPT | Performed by: FAMILY MEDICINE

## 2019-05-09 ENCOUNTER — HOSPITAL ENCOUNTER (OUTPATIENT)
Dept: GENERAL RADIOLOGY | Age: 69
Discharge: HOME | End: 2019-05-11
Payer: MEDICARE

## 2019-05-09 ENCOUNTER — HOSPITAL ENCOUNTER (OUTPATIENT)
Age: 69
Discharge: HOME | End: 2019-05-09
Payer: MEDICARE

## 2019-05-09 ENCOUNTER — OFFICE VISIT (OUTPATIENT)
Dept: FAMILY MEDICINE CLINIC | Age: 69
End: 2019-05-09
Payer: MEDICARE

## 2019-05-09 ENCOUNTER — HOSPITAL ENCOUNTER (OUTPATIENT)
Age: 69
Discharge: HOME | End: 2019-05-11
Payer: MEDICARE

## 2019-05-09 VITALS
HEART RATE: 68 BPM | BODY MASS INDEX: 39.38 KG/M2 | SYSTOLIC BLOOD PRESSURE: 110 MMHG | DIASTOLIC BLOOD PRESSURE: 80 MMHG | TEMPERATURE: 98.2 F | WEIGHT: 259 LBS | OXYGEN SATURATION: 96 %

## 2019-05-09 DIAGNOSIS — Z01.818 PREOPERATIVE EXAM FOR GYNECOLOGIC SURGERY: Primary | ICD-10-CM

## 2019-05-09 DIAGNOSIS — Z01.818 PREOPERATIVE EXAM FOR GYNECOLOGIC SURGERY: ICD-10-CM

## 2019-05-09 DIAGNOSIS — G25.0 BENIGN HEAD TREMOR: ICD-10-CM

## 2019-05-09 LAB
ABSOLUTE EOS #: 0.3 K/UL (ref 0–0.4)
ABSOLUTE IMMATURE GRANULOCYTE: NORMAL K/UL (ref 0–0.3)
ABSOLUTE LYMPH #: 1.8 K/UL (ref 1–4.8)
ABSOLUTE MONO #: 0.4 K/UL (ref 0–1)
ANION GAP SERPL CALCULATED.3IONS-SCNC: 11 MMOL/L (ref 9–17)
BASOPHILS # BLD: 0 % (ref 0–2)
BASOPHILS ABSOLUTE: 0 K/UL (ref 0–0.2)
BUN BLDV-MCNC: 13 MG/DL (ref 8–23)
BUN/CREAT BLD: 20 (ref 9–20)
CALCIUM SERPL-MCNC: 9.2 MG/DL (ref 8.6–10.4)
CHLORIDE BLD-SCNC: 105 MMOL/L (ref 98–107)
CO2: 27 MMOL/L (ref 20–31)
CREAT SERPL-MCNC: 0.66 MG/DL (ref 0.5–0.9)
DIFFERENTIAL TYPE: YES
EOSINOPHILS RELATIVE PERCENT: 5 % (ref 0–5)
GFR AFRICAN AMERICAN: >60 ML/MIN
GFR NON-AFRICAN AMERICAN: >60 ML/MIN
GFR SERPL CREATININE-BSD FRML MDRD: ABNORMAL ML/MIN/{1.73_M2}
GFR SERPL CREATININE-BSD FRML MDRD: ABNORMAL ML/MIN/{1.73_M2}
GLUCOSE BLD-MCNC: 119 MG/DL (ref 70–99)
HCT VFR BLD CALC: 43.2 % (ref 36–46)
HEMOGLOBIN: 14.4 G/DL (ref 12–16)
IMMATURE GRANULOCYTES: NORMAL %
LYMPHOCYTES # BLD: 26 % (ref 15–40)
MCH RBC QN AUTO: 30.4 PG (ref 26–34)
MCHC RBC AUTO-ENTMCNC: 33.3 G/DL (ref 31–37)
MCV RBC AUTO: 91.2 FL (ref 80–100)
MONOCYTES # BLD: 6 % (ref 4–8)
NRBC AUTOMATED: NORMAL PER 100 WBC
PDW BLD-RTO: 14.1 % (ref 12.1–15.2)
PLATELET # BLD: 322 K/UL (ref 140–450)
PLATELET ESTIMATE: NORMAL
PMV BLD AUTO: NORMAL FL (ref 6–12)
POTASSIUM SERPL-SCNC: 4.9 MMOL/L (ref 3.7–5.3)
RBC # BLD: 4.74 M/UL (ref 4–5.2)
RBC # BLD: NORMAL 10*6/UL
SEG NEUTROPHILS: 63 % (ref 47–75)
SEGMENTED NEUTROPHILS ABSOLUTE COUNT: 4.5 K/UL (ref 2.5–7)
SODIUM BLD-SCNC: 143 MMOL/L (ref 135–144)
WBC # BLD: 7.1 K/UL (ref 3.5–11)
WBC # BLD: NORMAL 10*3/UL

## 2019-05-09 PROCEDURE — 4004F PT TOBACCO SCREEN RCVD TLK: CPT | Performed by: NURSE PRACTITIONER

## 2019-05-09 PROCEDURE — 80048 BASIC METABOLIC PNL TOTAL CA: CPT

## 2019-05-09 PROCEDURE — G8427 DOCREV CUR MEDS BY ELIG CLIN: HCPCS | Performed by: NURSE PRACTITIONER

## 2019-05-09 PROCEDURE — 3017F COLORECTAL CA SCREEN DOC REV: CPT | Performed by: NURSE PRACTITIONER

## 2019-05-09 PROCEDURE — 85025 COMPLETE CBC W/AUTO DIFF WBC: CPT

## 2019-05-09 PROCEDURE — 93005 ELECTROCARDIOGRAM TRACING: CPT

## 2019-05-09 PROCEDURE — 1123F ACP DISCUSS/DSCN MKR DOCD: CPT | Performed by: NURSE PRACTITIONER

## 2019-05-09 PROCEDURE — G8417 CALC BMI ABV UP PARAM F/U: HCPCS | Performed by: NURSE PRACTITIONER

## 2019-05-09 PROCEDURE — 36415 COLL VENOUS BLD VENIPUNCTURE: CPT

## 2019-05-09 PROCEDURE — G8400 PT W/DXA NO RESULTS DOC: HCPCS | Performed by: NURSE PRACTITIONER

## 2019-05-09 PROCEDURE — 99213 OFFICE O/P EST LOW 20 MIN: CPT | Performed by: NURSE PRACTITIONER

## 2019-05-09 PROCEDURE — 1090F PRES/ABSN URINE INCON ASSESS: CPT | Performed by: NURSE PRACTITIONER

## 2019-05-09 PROCEDURE — 71046 X-RAY EXAM CHEST 2 VIEWS: CPT

## 2019-05-09 PROCEDURE — 4040F PNEUMOC VAC/ADMIN/RCVD: CPT | Performed by: NURSE PRACTITIONER

## 2019-05-09 ASSESSMENT — ENCOUNTER SYMPTOMS
SHORTNESS OF BREATH: 0
DIARRHEA: 0
ABDOMINAL PAIN: 0
CHEST TIGHTNESS: 0
BLOOD IN STOOL: 0
COUGH: 0
SORE THROAT: 0
BACK PAIN: 0
VOMITING: 0
CONSTIPATION: 0
NAUSEA: 0

## 2019-05-09 ASSESSMENT — PATIENT HEALTH QUESTIONNAIRE - PHQ9
2. FEELING DOWN, DEPRESSED OR HOPELESS: 0
1. LITTLE INTEREST OR PLEASURE IN DOING THINGS: 0
SUM OF ALL RESPONSES TO PHQ QUESTIONS 1-9: 0
SUM OF ALL RESPONSES TO PHQ QUESTIONS 1-9: 0
SUM OF ALL RESPONSES TO PHQ9 QUESTIONS 1 & 2: 0

## 2019-05-09 NOTE — PATIENT INSTRUCTIONS
SURVEY:    You may be receiving a survey from [employer] regarding your visit today. Please complete the survey to enable us to provide the highest quality of care to you and your family. If you cannot score us a very good on any question, please call the office to discuss how we could have made your experience a very good one. Thank you.

## 2019-05-09 NOTE — PROGRESS NOTES
HPI Notes    Name: Rajeev Ramirez  : 1950         Chief Complaint:     Chief Complaint   Patient presents with    Pre-op Exam     Patient here today for pre op physical. She will be having vaginal revision, excision of vaginal graft, cystoscopy. She will be having this on 19 with Dr. Ana Johnson at [facility]       History of Present Illness:        HPI  Pt is a 81 yo female who reports for preoperative clearance. Pt is having a vaginal revision, excision of vaginal graft, and a cystoscopy. Pt denies any recent illness. Pt is a current smoker. Pt has smoked cigarettes for the past 30 years. 19 pt switched to vaping instead of cigarettes. Past Medical History:     Past Medical History:   Diagnosis Date    Hyperlipidemia     Thyroid disease     Venous ulcer ([facility] [address]     history      Reviewed all health maintenance requirements and ordered appropriate tests  Health Maintenance Due   Topic Date Due    Hepatitis C screen  1950    Shingles Vaccine (1 of 2) 10/11/2001    Low dose CT lung screening  10/11/2006    DEXA (modify frequency per FRAX score)  10/11/2016    Colon cancer screen colonoscopy  10/30/2017    TSH testing  2019       Past Surgical History:     Past Surgical History:   Procedure Laterality Date    APPENDECTOMY  2018    ruptured appendix    BLADDER SUSPENSION      COLONOSCOPY      HEMORRHOID SURGERY      HYSTERECTOMY      TUBAL LIGATION      VEIN SURGERY      Lt leg \"vein removed\"        Medications:       Prior to Admission medications    Medication Sig Start Date End Date Taking? Authorizing Provider   levothyroxine (SYNTHROID) 50 MCG tablet TAKE 1 TABLET EVERY DAY 18  Yes SELENE Marino CNP   atorvastatin (LIPITOR) 10 MG tablet TAKE 1 TABLET EVERY DAY 18  Yes SELENE Marino CNP        Allergies:       Patient has no known allergies. Social History:     Tobacco:    reports that she has been smoking.   She has a 30.00 pack-year smoking history. She has never used smokeless tobacco.  Alcohol:      reports that she does not drink alcohol. Drug Use:  reports that she does not use drugs. Family History:        Family History   Problem Relation Age of Onset    Stroke Maternal Grandmother     Stroke Paternal Grandmother        Review of Systems:         Review of Systems   Constitutional: Negative for chills and fever. HENT: Negative for sore throat. Respiratory: Negative for cough, chest tightness and shortness of breath. Cardiovascular: Negative for chest pain, palpitations and leg swelling. Gastrointestinal: Negative for abdominal pain, blood in stool, constipation, diarrhea, nausea and vomiting. Genitourinary: Negative for dysuria, frequency and hematuria. Musculoskeletal: Negative for back pain and neck pain. Skin: Negative for rash. Neurological: Positive for tremors. Negative for dizziness, seizures, weakness and headaches. Hematological: Does not bruise/bleed easily. Psychiatric/Behavioral: Negative for suicidal ideas. The patient is not nervous/anxious. Physical Exam:     Vitals:  /80   Pulse 68   Temp 98.2 °F (36.8 °C) (Oral)   Wt 259 lb (117.5 kg)   SpO2 96%   BMI 39.38 kg/m²       Physical Exam   Constitutional: She is oriented to person, place, and time. Vital signs are normal. She appears well-developed and well-nourished. HENT:   Head: Normocephalic. Right Ear: Hearing, tympanic membrane and external ear normal.   Left Ear: Hearing, tympanic membrane and external ear normal.   Nose: Nose normal.   Mouth/Throat: Uvula is midline, oropharynx is clear and moist and mucous membranes are normal.   Eyes: Pupils are equal, round, and reactive to light. Conjunctivae and EOM are normal.   Neck: Trachea normal and normal range of motion. Carotid bruit is not present. No thyroid mass present.    Cardiovascular: Normal rate, regular rhythm, S1 normal, S2 normal, normal heart sounds and intact distal pulses. Pulses:       Dorsalis pedis pulses are 2+ on the right side, and 2+ on the left side. Pulmonary/Chest: Effort normal and breath sounds normal.   Abdominal: Soft. Normal appearance. There is no tenderness. Musculoskeletal: Normal range of motion. Neurological: She is alert and oriented to person, place, and time. She has normal strength and normal reflexes. GCS eye subscore is 4. GCS verbal subscore is 5. GCS motor subscore is 6. Tremor noted to head, no other tremors   Skin: Skin is warm, dry and intact. No rash noted. Psychiatric: She has a normal mood and affect. Her speech is normal and behavior is normal. Judgment and thought content normal. Cognition and memory are normal.   Nursing note and vitals reviewed. Data:     Lab Results   Component Value Date     09/22/2018    K 3.6 09/22/2018     09/22/2018    CO2 25 09/22/2018    BUN 16 09/22/2018    CREATININE 0.64 09/22/2018    GLUCOSE 118 09/22/2018    PROT 7.3 09/22/2018    LABALBU 4.1 09/22/2018    BILITOT 0.73 09/22/2018    ALKPHOS 76 09/22/2018    AST 18 09/22/2018    ALT 21 09/22/2018     Lab Results   Component Value Date    WBC 7.1 05/09/2019    RBC 4.74 05/09/2019    HGB 14.4 05/09/2019    HCT 43.2 05/09/2019    MCV 91.2 05/09/2019    MCH 30.4 05/09/2019    MCHC 33.3 05/09/2019    RDW 14.1 05/09/2019     05/09/2019    MPV NOT REPORTED 05/09/2019     Lab Results   Component Value Date    TSH 4.62 05/03/2018     Lab Results   Component Value Date    CHOL 151 05/03/2018    HDL 47 05/03/2018          Assessment & Plan        Diagnosis Orders   1. Preoperative exam for gynecologic surgery  --Physical exam completed prior to surgical procedure. Pt medical history, current medications, labs, EKG, CXR reviewed. Pt is acceptable risk for surgical procedure. As with any surgery, there are risks and ultimately it is the decision of the surgeon to proceed with the surgery or not.  Medical clearance on the following healthy behaviors: nutrition, exercise, medication adherence and tobacco cessation  Reviewed prior labs and health maintenance. Continue current medications, diet and exercise. Discussed use, benefit, and side effects of prescribed medications. Barriers to medication compliance addressed. Patient given educational materials - see patient instructions. All patient questions answered. Patient voiced understanding.

## 2019-05-10 LAB
EKG ATRIAL RATE: 66 BPM
EKG P AXIS: 17 DEGREES
EKG P-R INTERVAL: 176 MS
EKG Q-T INTERVAL: 432 MS
EKG QRS DURATION: 78 MS
EKG QTC CALCULATION (BAZETT): 452 MS
EKG R AXIS: 14 DEGREES
EKG T AXIS: 70 DEGREES
EKG VENTRICULAR RATE: 66 BPM

## 2019-07-08 ENCOUNTER — OFFICE VISIT (OUTPATIENT)
Dept: NEUROLOGY | Age: 69
End: 2019-07-08
Payer: MEDICARE

## 2019-07-08 VITALS
DIASTOLIC BLOOD PRESSURE: 78 MMHG | HEIGHT: 67 IN | WEIGHT: 258.9 LBS | BODY MASS INDEX: 40.63 KG/M2 | RESPIRATION RATE: 18 BRPM | HEART RATE: 66 BPM | SYSTOLIC BLOOD PRESSURE: 156 MMHG | TEMPERATURE: 97.5 F

## 2019-07-08 DIAGNOSIS — G25.0 BENIGN HEAD TREMOR: Primary | ICD-10-CM

## 2019-07-08 PROCEDURE — 4004F PT TOBACCO SCREEN RCVD TLK: CPT | Performed by: NEUROMUSCULOSKELETAL MEDICINE, SPORTS MEDICINE

## 2019-07-08 PROCEDURE — G8400 PT W/DXA NO RESULTS DOC: HCPCS | Performed by: NEUROMUSCULOSKELETAL MEDICINE, SPORTS MEDICINE

## 2019-07-08 PROCEDURE — 4040F PNEUMOC VAC/ADMIN/RCVD: CPT | Performed by: NEUROMUSCULOSKELETAL MEDICINE, SPORTS MEDICINE

## 2019-07-08 PROCEDURE — G8427 DOCREV CUR MEDS BY ELIG CLIN: HCPCS | Performed by: NEUROMUSCULOSKELETAL MEDICINE, SPORTS MEDICINE

## 2019-07-08 PROCEDURE — 1123F ACP DISCUSS/DSCN MKR DOCD: CPT | Performed by: NEUROMUSCULOSKELETAL MEDICINE, SPORTS MEDICINE

## 2019-07-08 PROCEDURE — 1090F PRES/ABSN URINE INCON ASSESS: CPT | Performed by: NEUROMUSCULOSKELETAL MEDICINE, SPORTS MEDICINE

## 2019-07-08 PROCEDURE — G8417 CALC BMI ABV UP PARAM F/U: HCPCS | Performed by: NEUROMUSCULOSKELETAL MEDICINE, SPORTS MEDICINE

## 2019-07-08 PROCEDURE — 3017F COLORECTAL CA SCREEN DOC REV: CPT | Performed by: NEUROMUSCULOSKELETAL MEDICINE, SPORTS MEDICINE

## 2019-07-08 PROCEDURE — 99204 OFFICE O/P NEW MOD 45 MIN: CPT | Performed by: NEUROMUSCULOSKELETAL MEDICINE, SPORTS MEDICINE

## 2019-07-08 RX ORDER — PRIMIDONE 50 MG/1
25 TABLET ORAL NIGHTLY
Qty: 15 TABLET | Refills: 1 | Status: SHIPPED | OUTPATIENT
Start: 2019-07-08 | End: 2021-12-09

## 2019-07-08 NOTE — PROGRESS NOTES
NEUROLOGY CONSULT    Patient Name:  Darcie Tinajero  :   1950  Clinic Visit Date: 2019    I saw Ms. Darcie Tinajero  in the neurology clinic today for evaluation of head tremors, which she has had for almost a year. The tremors began subacutely and has been persistent. She has not paid much attention to the tremors herself , but other people around her have observed the tremor. Tremors are mild to occasionally moderate ,intermittent and persistent throughout the day. No pain or spasms in the neck or abnormal neck movements. No history of tremors of the upper or lower extremities. No history of headache, abnormal  visual symptoms, slurred speech, facial twithching,  numbness or tingling, difficulty speaking, voice tremor, difficulty in swallowing, or focal weakness of the extremities. No history of impaired gait or loss of balance. No history of shuffling , stooped gait or falls. REVIEW OF SYSTEMS    Constitutional Weight changes: absent, change in appetite: absent Fatigue: absent; Fevers : absent, Any recent hospitalizations:  Present-May 13 mesh removal   HEENT Ears: normal,  Visual disturbance: absent   Reespiratory Shortness of breath: absent, choking:  absent, Cough: present, Snoring : present   Cardivascular Chest pain: absent, Leg swelling :present, palpitations : absent, fainting : absent   GI Constipation: absent, Diarrhea: absent, Swallowing change: absent    Urinary frequency: absent, Urinary urgency: absent, Urinary incontinence: absent   Musculoskeletal Neck pain: absent, Back pain: present, Stiffness: present, Muscle pain: absent, Joint pain: absent, restless leg : absent   Dermatological Hair loss: absent, Skin changes: absent   Neurological Confusion: absent, Trouble concentrating: absent, Seizures: absent;  Memory loss: absent, balance problem: present, Dizziness: present, vertigo: present, Weakness: absent, Numbness absent, Tremor: present, Spasm: absent, involuntary limbs.  There are no abnormal limb movements. SENSORY EXAMINATION: Normal.     STRETCH REFLEXES: 2+ and symmetrical in both the upper and lower limbs. GAIT: There is no ataxia. Romberg sign is negative. IMPRESSION:    1. Primary head tremor. Essential tremor. No evidence of Parkinson's disease or torticollis. 2.  Hypothyroidism. 3.  Hyperlipidemia    PLAN:    1.  I have started her on a small dose of primidone (Mysoline) 25 mg at bedtime. Potential medication side effects were explained to the patient in detail  2. Follow-up in 1 month for reevaluation      NOTE: This neurology evaluation is part of outpatient coverage at [facility]  1-2 days per week. Patients requiring frequent evaluations or uncomfortable with potential 3-4 day turnaround on questions or calls  may be better served by a neurologist in the area full time. [facility] at [address] [place] [address]. Santos/Manisha is available for outpatient visits and procedures including EMG/NCS. Non-[facility] neurologists also practice in [facility] (Dr. Brianna Xiong) and [facility] (Daniele Whitmore).        Donavon Holley MD   7/8/2019  9:24 AM        CC: Gabriella CONNELL

## 2019-09-04 ENCOUNTER — OFFICE VISIT (OUTPATIENT)
Dept: FAMILY MEDICINE CLINIC | Age: 69
End: 2019-09-04
Payer: MEDICARE

## 2019-09-04 ENCOUNTER — HOSPITAL ENCOUNTER (OUTPATIENT)
Dept: BONE DENSITY | Age: 69
Discharge: HOME | End: 2019-09-06
Payer: MEDICARE

## 2019-09-04 ENCOUNTER — HOSPITAL ENCOUNTER (OUTPATIENT)
Dept: MAMMOGRAPHY | Age: 69
Discharge: HOME | End: 2019-09-06
Payer: MEDICARE

## 2019-09-04 VITALS
WEIGHT: 255 LBS | TEMPERATURE: 98.1 F | HEART RATE: 64 BPM | OXYGEN SATURATION: 95 % | SYSTOLIC BLOOD PRESSURE: 128 MMHG | DIASTOLIC BLOOD PRESSURE: 82 MMHG | BODY MASS INDEX: 40.02 KG/M2 | HEIGHT: 67 IN

## 2019-09-04 DIAGNOSIS — Z12.31 SCREENING MAMMOGRAM, ENCOUNTER FOR: ICD-10-CM

## 2019-09-04 DIAGNOSIS — M81.0 AGE-RELATED OSTEOPOROSIS WITHOUT CURRENT PATHOLOGICAL FRACTURE: ICD-10-CM

## 2019-09-04 DIAGNOSIS — Z91.89 AT RISK FOR BONE DENSITY LOSS: ICD-10-CM

## 2019-09-04 DIAGNOSIS — Z00.00 ROUTINE GENERAL MEDICAL EXAMINATION AT A HEALTH CARE FACILITY: Primary | ICD-10-CM

## 2019-09-04 DIAGNOSIS — Z12.11 COLON CANCER SCREENING: ICD-10-CM

## 2019-09-04 DIAGNOSIS — E03.9 ACQUIRED HYPOTHYROIDISM: ICD-10-CM

## 2019-09-04 DIAGNOSIS — E78.2 MIXED HYPERLIPIDEMIA: ICD-10-CM

## 2019-09-04 PROCEDURE — G0438 PPPS, INITIAL VISIT: HCPCS | Performed by: NURSE PRACTITIONER

## 2019-09-04 PROCEDURE — 77067 SCR MAMMO BI INCL CAD: CPT

## 2019-09-04 PROCEDURE — 3017F COLORECTAL CA SCREEN DOC REV: CPT | Performed by: NURSE PRACTITIONER

## 2019-09-04 PROCEDURE — 77080 DXA BONE DENSITY AXIAL: CPT

## 2019-09-04 PROCEDURE — 4040F PNEUMOC VAC/ADMIN/RCVD: CPT | Performed by: NURSE PRACTITIONER

## 2019-09-04 PROCEDURE — 1123F ACP DISCUSS/DSCN MKR DOCD: CPT | Performed by: NURSE PRACTITIONER

## 2019-09-04 RX ORDER — LEVOTHYROXINE SODIUM 0.05 MG/1
TABLET ORAL
Qty: 90 TABLET | Refills: 1 | Status: SHIPPED | OUTPATIENT
Start: 2019-09-04 | End: 2020-03-04 | Stop reason: SDUPTHER

## 2019-09-04 RX ORDER — ATORVASTATIN CALCIUM 10 MG/1
TABLET, FILM COATED ORAL
Qty: 90 TABLET | Refills: 1 | Status: SHIPPED | OUTPATIENT
Start: 2019-09-04 | End: 2020-03-04 | Stop reason: SDUPTHER

## 2019-09-04 ASSESSMENT — LIFESTYLE VARIABLES
HOW OFTEN DO YOU HAVE A DRINK CONTAINING ALCOHOL: 1
HOW OFTEN DURING THE LAST YEAR HAVE YOU BEEN UNABLE TO REMEMBER WHAT HAPPENED THE NIGHT BEFORE BECAUSE YOU HAD BEEN DRINKING: 0
HAVE YOU OR SOMEONE ELSE BEEN INJURED AS A RESULT OF YOUR DRINKING: 0
HOW OFTEN DURING THE LAST YEAR HAVE YOU FOUND THAT YOU WERE NOT ABLE TO STOP DRINKING ONCE YOU HAD STARTED: 0
HOW OFTEN DURING THE LAST YEAR HAVE YOU NEEDED AN ALCOHOLIC DRINK FIRST THING IN THE MORNING TO GET YOURSELF GOING AFTER A NIGHT OF HEAVY DRINKING: 0
AUDIT TOTAL SCORE: 1
HOW MANY STANDARD DRINKS CONTAINING ALCOHOL DO YOU HAVE ON A TYPICAL DAY: 0
HOW OFTEN DURING THE LAST YEAR HAVE YOU FAILED TO DO WHAT WAS NORMALLY EXPECTED FROM YOU BECAUSE OF DRINKING: 0
HAS A RELATIVE, FRIEND, DOCTOR, OR ANOTHER HEALTH PROFESSIONAL EXPRESSED CONCERN ABOUT YOUR DRINKING OR SUGGESTED YOU CUT DOWN: 0
AUDIT-C TOTAL SCORE: 1
HOW OFTEN DURING THE LAST YEAR HAVE YOU HAD A FEELING OF GUILT OR REMORSE AFTER DRINKING: 0
HOW OFTEN DO YOU HAVE SIX OR MORE DRINKS ON ONE OCCASION: 0

## 2019-09-04 ASSESSMENT — PATIENT HEALTH QUESTIONNAIRE - PHQ9
SUM OF ALL RESPONSES TO PHQ QUESTIONS 1-9: 0
SUM OF ALL RESPONSES TO PHQ QUESTIONS 1-9: 0

## 2019-09-04 NOTE — PATIENT INSTRUCTIONS
SURVEY:    You may be receiving a survey from [employer] regarding your visit today. Please complete the survey to enable us to provide the highest quality of care to you and your family. If you cannot score us a very good on any question, please call the office to discuss how we could have made your experience a very good one. Thank you. Personalized Preventive Plan for [place] - 9/4/2019  Medicare offers a range of preventive health benefits. Some of the tests and screenings are paid in full while other may be subject to a deductible, co-insurance, and/or copay. Some of these benefits include a comprehensive review of your medical history including lifestyle, illnesses that may run in your family, and various assessments and screenings as appropriate. After reviewing your medical record and screening and assessments performed today your provider may have ordered immunizations, labs, imaging, and/or referrals for you. A list of these orders (if applicable) as well as your Preventive Care list are included within your After Visit Summary for your review. Other Preventive Recommendations:    · A preventive eye exam performed by an eye specialist is recommended every 1-2 years to screen for glaucoma; cataracts, macular degeneration, and other eye disorders. · A preventive dental visit is recommended every 6 months. · Try to get at least 150 minutes of exercise per week or 10,000 steps per day on a pedometer . · Order or download the FREE \"Exercise & Physical Activity: Your Everyday Guide\" from [employer]. Call 5-491.312.5996 or search [employer] online. · You need 4811-2032 mg of calcium and 2139-2119 IU of vitamin D per day.  It is possible to meet your calcium requirement with diet alone, but a vitamin D supplement is usually necessary to meet this goal.  · When exposed to the sun, use a sunscreen that protects against both UVA and UVB radiation with an SPF of 30 or greater. Reapply every 2 to 3 hours or after sweating, drying off with a towel, or swimming. · Always wear a seat belt when traveling in a car. Always wear a helmet when riding a bicycle or motorcycle.

## 2019-09-10 DIAGNOSIS — Z12.11 COLON CANCER SCREENING: ICD-10-CM

## 2019-09-10 LAB
CONTROL: PRESENT
HEMOCCULT STL QL: NORMAL

## 2019-09-10 PROCEDURE — 82274 ASSAY TEST FOR BLOOD FECAL: CPT | Performed by: NURSE PRACTITIONER

## 2020-02-13 ENCOUNTER — HOSPITAL ENCOUNTER (OUTPATIENT)
Dept: GENERAL RADIOLOGY | Age: 70
Discharge: HOME | End: 2020-02-15
Payer: MEDICARE

## 2020-02-13 ENCOUNTER — HOSPITAL ENCOUNTER (OUTPATIENT)
Age: 70
Discharge: HOME | End: 2020-02-15
Payer: MEDICARE

## 2020-02-13 PROCEDURE — 73030 X-RAY EXAM OF SHOULDER: CPT

## 2020-03-04 RX ORDER — LEVOTHYROXINE SODIUM 0.05 MG/1
TABLET ORAL
Qty: 90 TABLET | Refills: 1 | Status: SHIPPED | OUTPATIENT
Start: 2020-03-04 | End: 2020-08-31

## 2020-03-04 RX ORDER — ATORVASTATIN CALCIUM 10 MG/1
TABLET, FILM COATED ORAL
Qty: 90 TABLET | Refills: 1 | Status: SHIPPED | OUTPATIENT
Start: 2020-03-04 | End: 2020-08-31

## 2020-03-04 NOTE — TELEPHONE ENCOUNTER
Last OV: 9/4/2019 for AWV  Last RX: Levothyroxine 50 mcg  Atorvastatin 10 mg  Next scheduled apt: Visit date not found

## 2020-07-30 ENCOUNTER — HOSPITAL ENCOUNTER (OUTPATIENT)
Dept: MRI IMAGING | Age: 70
Discharge: HOME | End: 2020-08-01
Payer: MEDICARE

## 2020-07-30 PROCEDURE — 73221 MRI JOINT UPR EXTREM W/O DYE: CPT

## 2020-08-31 RX ORDER — ATORVASTATIN CALCIUM 10 MG/1
TABLET, FILM COATED ORAL
Qty: 30 TABLET | Refills: 0 | Status: SHIPPED | OUTPATIENT
Start: 2020-08-31 | End: 2020-09-14 | Stop reason: SDUPTHER

## 2020-08-31 RX ORDER — LEVOTHYROXINE SODIUM 0.05 MG/1
TABLET ORAL
Qty: 30 TABLET | Refills: 0 | Status: SHIPPED | OUTPATIENT
Start: 2020-08-31 | End: 2020-09-14 | Stop reason: SDUPTHER

## 2020-09-04 ENCOUNTER — HOSPITAL ENCOUNTER (OUTPATIENT)
Age: 70
Discharge: HOME | End: 2020-09-04
Payer: MEDICARE

## 2020-09-04 LAB
ALBUMIN SERPL-MCNC: 4.5 G/DL (ref 3.5–5.2)
ALBUMIN/GLOBULIN RATIO: ABNORMAL (ref 1–2.5)
ALP BLD-CCNC: 82 U/L (ref 35–104)
ALT SERPL-CCNC: 31 U/L (ref 5–33)
ANION GAP SERPL CALCULATED.3IONS-SCNC: 7 MMOL/L (ref 9–17)
AST SERPL-CCNC: 20 U/L
BILIRUB SERPL-MCNC: 0.53 MG/DL (ref 0.3–1.2)
BUN BLDV-MCNC: 17 MG/DL (ref 8–23)
BUN/CREAT BLD: 25 (ref 9–20)
CALCIUM SERPL-MCNC: 9.5 MG/DL (ref 8.6–10.4)
CHLORIDE BLD-SCNC: 104 MMOL/L (ref 98–107)
CHOLESTEROL/HDL RATIO: 3.2
CHOLESTEROL: 140 MG/DL
CO2: 29 MMOL/L (ref 20–31)
CREAT SERPL-MCNC: 0.67 MG/DL (ref 0.5–0.9)
GFR AFRICAN AMERICAN: >60 ML/MIN
GFR NON-AFRICAN AMERICAN: >60 ML/MIN
GFR SERPL CREATININE-BSD FRML MDRD: ABNORMAL ML/MIN/{1.73_M2}
GFR SERPL CREATININE-BSD FRML MDRD: ABNORMAL ML/MIN/{1.73_M2}
GLUCOSE BLD-MCNC: 106 MG/DL (ref 70–99)
HCT VFR BLD CALC: 44.8 % (ref 36–46)
HDLC SERPL-MCNC: 44 MG/DL
HEMOGLOBIN: 15 G/DL (ref 12–16)
LDL CHOLESTEROL: 74 MG/DL (ref 0–130)
MCH RBC QN AUTO: 30.3 PG (ref 26–34)
MCHC RBC AUTO-ENTMCNC: 33.5 G/DL (ref 31–37)
MCV RBC AUTO: 90.6 FL (ref 80–100)
NRBC AUTOMATED: NORMAL PER 100 WBC
PATIENT FASTING?: YES
PDW BLD-RTO: 14.3 % (ref 12.1–15.2)
PLATELET # BLD: 313 K/UL (ref 140–450)
PMV BLD AUTO: NORMAL FL (ref 6–12)
POTASSIUM SERPL-SCNC: 4.6 MMOL/L (ref 3.7–5.3)
RBC # BLD: 4.95 M/UL (ref 4–5.2)
SODIUM BLD-SCNC: 140 MMOL/L (ref 135–144)
TOTAL PROTEIN: 9.1 G/DL (ref 6.4–8.3)
TRIGL SERPL-MCNC: 108 MG/DL
TSH SERPL DL<=0.05 MIU/L-ACNC: 4.84 MIU/L (ref 0.3–5)
VLDLC SERPL CALC-MCNC: NORMAL MG/DL (ref 1–30)
WBC # BLD: 9.2 K/UL (ref 3.5–11)

## 2020-09-04 PROCEDURE — 85027 COMPLETE CBC AUTOMATED: CPT

## 2020-09-04 PROCEDURE — 36415 COLL VENOUS BLD VENIPUNCTURE: CPT

## 2020-09-04 PROCEDURE — 80053 COMPREHEN METABOLIC PANEL: CPT

## 2020-09-04 PROCEDURE — 80061 LIPID PANEL: CPT

## 2020-09-04 PROCEDURE — 84443 ASSAY THYROID STIM HORMONE: CPT

## 2020-09-14 ENCOUNTER — OFFICE VISIT (OUTPATIENT)
Dept: FAMILY MEDICINE CLINIC | Age: 70
End: 2020-09-14
Payer: MEDICARE

## 2020-09-14 VITALS
SYSTOLIC BLOOD PRESSURE: 120 MMHG | BODY MASS INDEX: 38.8 KG/M2 | HEART RATE: 72 BPM | DIASTOLIC BLOOD PRESSURE: 68 MMHG | HEIGHT: 68 IN | TEMPERATURE: 98.3 F | WEIGHT: 256 LBS | OXYGEN SATURATION: 98 %

## 2020-09-14 PROBLEM — E66.01 MORBIDLY OBESE (HCC): Status: ACTIVE | Noted: 2020-09-14

## 2020-09-14 PROCEDURE — 4040F PNEUMOC VAC/ADMIN/RCVD: CPT | Performed by: NURSE PRACTITIONER

## 2020-09-14 PROCEDURE — 4004F PT TOBACCO SCREEN RCVD TLK: CPT | Performed by: NURSE PRACTITIONER

## 2020-09-14 PROCEDURE — 3017F COLORECTAL CA SCREEN DOC REV: CPT | Performed by: NURSE PRACTITIONER

## 2020-09-14 PROCEDURE — 1123F ACP DISCUSS/DSCN MKR DOCD: CPT | Performed by: NURSE PRACTITIONER

## 2020-09-14 PROCEDURE — 99214 OFFICE O/P EST MOD 30 MIN: CPT | Performed by: NURSE PRACTITIONER

## 2020-09-14 PROCEDURE — G8427 DOCREV CUR MEDS BY ELIG CLIN: HCPCS | Performed by: NURSE PRACTITIONER

## 2020-09-14 PROCEDURE — 1090F PRES/ABSN URINE INCON ASSESS: CPT | Performed by: NURSE PRACTITIONER

## 2020-09-14 PROCEDURE — G8399 PT W/DXA RESULTS DOCUMENT: HCPCS | Performed by: NURSE PRACTITIONER

## 2020-09-14 PROCEDURE — G8417 CALC BMI ABV UP PARAM F/U: HCPCS | Performed by: NURSE PRACTITIONER

## 2020-09-14 RX ORDER — ATORVASTATIN CALCIUM 10 MG/1
TABLET, FILM COATED ORAL
Qty: 90 TABLET | Refills: 1 | Status: SHIPPED | OUTPATIENT
Start: 2020-09-14 | End: 2021-04-05

## 2020-09-14 RX ORDER — MELOXICAM 15 MG/1
TABLET ORAL
COMMUNITY
Start: 2020-08-06 | End: 2021-12-09 | Stop reason: ALTCHOICE

## 2020-09-14 RX ORDER — LEVOTHYROXINE SODIUM 0.05 MG/1
TABLET ORAL
Qty: 90 TABLET | Refills: 1 | Status: SHIPPED | OUTPATIENT
Start: 2020-09-14 | End: 2021-04-05

## 2020-09-14 SDOH — ECONOMIC STABILITY: FOOD INSECURITY: WITHIN THE PAST 12 MONTHS, YOU WORRIED THAT YOUR FOOD WOULD RUN OUT BEFORE YOU GOT MONEY TO BUY MORE.: NEVER TRUE

## 2020-09-14 SDOH — ECONOMIC STABILITY: INCOME INSECURITY: HOW HARD IS IT FOR YOU TO PAY FOR THE VERY BASICS LIKE FOOD, HOUSING, MEDICAL CARE, AND HEATING?: NOT HARD AT ALL

## 2020-09-14 SDOH — ECONOMIC STABILITY: TRANSPORTATION INSECURITY
IN THE PAST 12 MONTHS, HAS LACK OF TRANSPORTATION KEPT YOU FROM MEETINGS, WORK, OR FROM GETTING THINGS NEEDED FOR DAILY LIVING?: NO

## 2020-09-14 SDOH — ECONOMIC STABILITY: TRANSPORTATION INSECURITY
IN THE PAST 12 MONTHS, HAS THE LACK OF TRANSPORTATION KEPT YOU FROM MEDICAL APPOINTMENTS OR FROM GETTING MEDICATIONS?: NO

## 2020-09-14 SDOH — ECONOMIC STABILITY: FOOD INSECURITY: WITHIN THE PAST 12 MONTHS, THE FOOD YOU BOUGHT JUST DIDN'T LAST AND YOU DIDN'T HAVE MONEY TO GET MORE.: NEVER TRUE

## 2020-09-14 ASSESSMENT — PATIENT HEALTH QUESTIONNAIRE - PHQ9
2. FEELING DOWN, DEPRESSED OR HOPELESS: 0
SUM OF ALL RESPONSES TO PHQ9 QUESTIONS 1 & 2: 0
SUM OF ALL RESPONSES TO PHQ QUESTIONS 1-9: 0
1. LITTLE INTEREST OR PLEASURE IN DOING THINGS: 0
SUM OF ALL RESPONSES TO PHQ QUESTIONS 1-9: 0

## 2020-09-14 ASSESSMENT — ENCOUNTER SYMPTOMS
VOMITING: 0
BLOOD IN STOOL: 0
NAUSEA: 0
COUGH: 0
BACK PAIN: 0
SORE THROAT: 0
SHORTNESS OF BREATH: 0
CONSTIPATION: 0
DIARRHEA: 0
ABDOMINAL PAIN: 0

## 2020-09-14 NOTE — PROGRESS NOTES
HPI Notes    Name: Jewel Wetzel  : 1950         Chief Complaint:     Chief Complaint   Patient presents with    Hyperlipidemia     Patient here today for checkup    Hypothyroidism    Obesity     On [place] gap       History of Present Illness:        Hyperlipidemia   This is a chronic problem. The problem is controlled. Recent lipid tests were reviewed and are normal. Exacerbating diseases include obesity. Factors aggravating her hyperlipidemia include fatty foods and smoking. Pertinent negatives include no chest pain or shortness of breath. Current antihyperlipidemic treatment includes statins. The current treatment provides moderate improvement of lipids. Compliance problems include adherence to diet and adherence to exercise. Risk factors for coronary artery disease include dyslipidemia, obesity and post-menopausal.     Hypothyroid  Pt has hypothyroidism. Most recent TSH=4.84 (2020). Pt feels good. Denies any problems with energy level, hair loss, or temperature regulation. Obesity  Pt has struggled with obesity her entire adult life. Pt states \"it's hard when you like to eat\". Pt admits to not watching her diet at all. Pt does stay active but doesn't do any exercise.   Past Medical History:     Past Medical History:   Diagnosis Date    Hyperlipidemia     Thyroid disease     Venous ulcer ([facility] [address]     history      Reviewed all health maintenance requirements and ordered appropriate tests  Health Maintenance Due   Topic Date Due    Hepatitis C screen  1950    Shingles Vaccine (1 of 2) 10/11/2001    Low dose CT lung screening  10/11/2006    Colon cancer screen colonoscopy  10/30/2017    Annual Wellness Visit (AWV)  2019    Pneumococcal 65+ years Vaccine (2 of 2 - PPSV23) 2019    Flu vaccine (1) 2020       Past Surgical History:     Past Surgical History:   Procedure Laterality Date    APPENDECTOMY  2018    ruptured appendix    BLADDER SUSPENSION      COLONOSCOPY  HEMORRHOID SURGERY      HYSTERECTOMY      TUBAL LIGATION      VEIN SURGERY      Lt leg \"vein removed\"        Medications:       Prior to Admission medications    Medication Sig Start Date End Date Taking? Authorizing Provider   levothyroxine (SYNTHROID) 50 MCG tablet take 1 tablet by mouth once daily 9/14/20  Yes SELENE Cherry CNP   atorvastatin (LIPITOR) 10 MG tablet take 1 tablet by mouth once daily 9/14/20  Yes SELENE Cherry CNP   aspirin 81 MG tablet Take 81 mg by mouth daily   Yes Historical Provider, MD   meloxicam (MOBIC) 15 MG tablet take 1 tablet by mouth once daily 8/6/20   Historical Provider, MD   primidone (MYSOLINE) 50 MG tablet Take 0.5 tablets by mouth nightly 7/8/19 8/7/19  Kanika Ball MD        Allergies:       Patient has no known allergies. Social History:     Tobacco:    reports that she has been smoking. She has a 30.00 pack-year smoking history. She has never used smokeless tobacco.  Alcohol:      reports no history of alcohol use. Drug Use:  reports no history of drug use. Family History:        Family History   Problem Relation Age of Onset    Stroke Maternal Grandmother     Stroke Paternal Grandmother     No Known Problems Mother     No Known Problems Father        Review of Systems:         Review of Systems   Constitutional: Negative for chills and fever. HENT: Negative for sore throat. Respiratory: Negative for cough and shortness of breath. Cardiovascular: Negative for chest pain, palpitations and leg swelling. Gastrointestinal: Negative for abdominal pain, blood in stool, constipation, diarrhea, nausea and vomiting. Genitourinary: Negative for dysuria, frequency and hematuria. Musculoskeletal: Negative for back pain and neck pain. Skin: Negative for rash. Neurological: Negative for dizziness, tremors, seizures, weakness and headaches. Hematological: Does not bruise/bleed easily.    Psychiatric/Behavioral: Negative for suicidal ideas. The patient is not nervous/anxious. Physical Exam:     Vitals:  /68   Pulse 72   Temp 98.3 °F (36.8 °C) (Oral)   Ht 5' 8\" (1.727 m)   Wt 256 lb (116.1 kg)   SpO2 98%   BMI 38.92 kg/m²       Physical Exam  Vitals signs and nursing note reviewed. Constitutional:       Appearance: Normal appearance. She is well-developed. HENT:      Head: Normocephalic. Right Ear: Hearing, tympanic membrane and external ear normal.      Left Ear: Hearing, tympanic membrane and external ear normal.      Nose: Nose normal.      Mouth/Throat:      Pharynx: Uvula midline. Eyes:      Conjunctiva/sclera: Conjunctivae normal.      Pupils: Pupils are equal, round, and reactive to light. Neck:      Musculoskeletal: Normal range of motion. Thyroid: No thyroid mass. Vascular: No carotid bruit. Trachea: Trachea normal.   Cardiovascular:      Rate and Rhythm: Normal rate and regular rhythm. Pulses:           Dorsalis pedis pulses are 2+ on the right side and 2+ on the left side. Heart sounds: Normal heart sounds, S1 normal and S2 normal.   Pulmonary:      Effort: Pulmonary effort is normal.      Breath sounds: Normal breath sounds. Abdominal:      Palpations: Abdomen is soft. Tenderness: There is no abdominal tenderness. Musculoskeletal: Normal range of motion. Skin:     General: Skin is warm and dry. Findings: No rash. Neurological:      Mental Status: She is alert and oriented to person, place, and time. GCS: GCS eye subscore is 4. GCS verbal subscore is 5. GCS motor subscore is 6. Deep Tendon Reflexes: Reflexes are normal and symmetric. Psychiatric:         Speech: Speech normal.         Behavior: Behavior normal.         Thought Content:  Thought content normal.         Judgment: Judgment normal.               Data:     Lab Results   Component Value Date     09/04/2020    K 4.6 09/04/2020     09/04/2020    CO2 29 09/04/2020    BUN 17 09/04/2020    CREATININE 0.67 09/04/2020    GLUCOSE 106 09/04/2020    PROT 9.1 09/04/2020    LABALBU 4.5 09/04/2020    BILITOT 0.53 09/04/2020    ALKPHOS 82 09/04/2020    AST 20 09/04/2020    ALT 31 09/04/2020     Lab Results   Component Value Date    WBC 9.2 09/04/2020    RBC 4.95 09/04/2020    HGB 15.0 09/04/2020    HCT 44.8 09/04/2020    MCV 90.6 09/04/2020    MCH 30.3 09/04/2020    MCHC 33.5 09/04/2020    RDW 14.3 09/04/2020     09/04/2020    MPV NOT REPORTED 09/04/2020     Lab Results   Component Value Date    TSH 4.84 09/04/2020     Lab Results   Component Value Date    CHOL 140 09/04/2020    CHOL 151 05/03/2018    HDL 44 09/04/2020          Assessment & Plan        Diagnosis Orders   1. Mixed hyperlipidemia  --tolerating statin well. AST and ALT WNL. atorvastatin (LIPITOR) 10 MG tablet   2. Acquired hypothyroidism  --TSH=4.84. continue synthroid 50mcg daily. 3. Morbidly obese ([address]  --Pt educated about regular exercise and calorie reduction. Exercise should involve getting the heart rate elevated and keeping it elevated. Should exercise at least 1 hour 3 x per week. Pt educated about tracking calories and food intake with any calorie tracking anant. Focus on decreasing portion sizes, eating lean meats, and vegetables. Increase water intake to 64-80oz daily. 4. Tobacco abuse   --pt encouraged to stop smoking    5. Colon cancer screening  --pt given FIT test  POCT Fecal Immunochemical Test (FIT)     Patient verbalizes understanding and agreement with plan. All questions answered. If symptoms do not resolve or worsen, return to office. Completed Refills   Requested Prescriptions     Signed Prescriptions Disp Refills    levothyroxine (SYNTHROID) 50 MCG tablet 90 tablet 1     Sig: take 1 tablet by mouth once daily    atorvastatin (LIPITOR) 10 MG tablet 90 tablet 1     Sig: take 1 tablet by mouth once daily     No follow-ups on file.      Orders Placed This Encounter

## 2020-09-14 NOTE — PATIENT INSTRUCTIONS
SURVEY:    You may be receiving a survey from [employer] regarding your visit today. Please complete the survey to enable us to provide the highest quality of care to you and your family. If you cannot score us a very good (5 Stars) on any question, please call the office to discuss how we could have made your experience a very good one. Thank you.     Clinical Care Team: SELENE Mendez-KEAGAN Santacruz LPN    Clerical Team: Natasha Gardner

## 2020-09-21 LAB
CONTROL: PRESENT
HEMOCCULT STL QL: NEGATIVE

## 2020-09-21 PROCEDURE — 82274 ASSAY TEST FOR BLOOD FECAL: CPT | Performed by: NURSE PRACTITIONER

## 2021-04-04 DIAGNOSIS — E78.2 MIXED HYPERLIPIDEMIA: ICD-10-CM

## 2021-04-05 RX ORDER — ATORVASTATIN CALCIUM 10 MG/1
TABLET, FILM COATED ORAL
Qty: 90 TABLET | Refills: 0 | Status: SHIPPED | OUTPATIENT
Start: 2021-04-05 | End: 2021-07-12

## 2021-04-05 RX ORDER — LEVOTHYROXINE SODIUM 0.05 MG/1
TABLET ORAL
Qty: 90 TABLET | Refills: 0 | Status: SHIPPED | OUTPATIENT
Start: 2021-04-05 | End: 2021-07-12

## 2021-07-11 DIAGNOSIS — E78.2 MIXED HYPERLIPIDEMIA: ICD-10-CM

## 2021-07-12 RX ORDER — LEVOTHYROXINE SODIUM 0.05 MG/1
TABLET ORAL
Qty: 90 TABLET | Refills: 0 | Status: SHIPPED | OUTPATIENT
Start: 2021-07-12 | End: 2021-10-19

## 2021-07-12 RX ORDER — ATORVASTATIN CALCIUM 10 MG/1
TABLET, FILM COATED ORAL
Qty: 90 TABLET | Refills: 0 | Status: SHIPPED | OUTPATIENT
Start: 2021-07-12 | End: 2021-10-19

## 2021-07-12 NOTE — TELEPHONE ENCOUNTER
Zeb's patient      Last OV: 9/14/2020 check up  Last RX:    Next scheduled apt: Visit date not found        Sure scripts request    Rx's pending

## 2021-07-12 NOTE — TELEPHONE ENCOUNTER
Please tell pt her Rx sent for 90d with no refill as she need appt in Sept/Oct with [facility] for her year check up

## 2021-09-24 ENCOUNTER — CONSULT (OUTPATIENT)
Dept: URBAN - METROPOLITAN AREA CLINIC 38 | Facility: CLINIC | Age: 71
Setting detail: DERMATOLOGY
End: 2021-09-24

## 2021-09-24 ENCOUNTER — RX ONLY (RX ONLY)
Age: 71
End: 2021-09-24

## 2021-09-24 DIAGNOSIS — L57.0 ACTINIC KERATOSIS: ICD-10-CM

## 2021-09-24 PROBLEM — D23.39 OTHER BENIGN NEOPLASM OF SKIN OF OTHER PARTS OF FACE: Status: RESOLVED | Noted: 2021-09-24

## 2021-09-24 PROBLEM — D23.5 OTHER BENIGN NEOPLASM OF SKIN OF TRUNK: Status: RESOLVED | Noted: 2021-09-24

## 2021-09-24 PROBLEM — L81.0 POSTINFLAMMATORY HYPERPIGMENTATION: Status: RESOLVED | Noted: 2021-09-24
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
## 2021-09-24 PROCEDURE — 99203 OFFICE O/P NEW LOW 30 MIN: CPT

## 2021-09-24 RX ORDER — TRIAMCINOLONE ACETONIDE 1 MG/G
AS DIRECTED CREAM TOPICAL TWICE A DAY
Qty: 454 | Refills: 1
Start: 2021-09-24

## 2021-10-19 DIAGNOSIS — E78.2 MIXED HYPERLIPIDEMIA: ICD-10-CM

## 2021-10-19 RX ORDER — LEVOTHYROXINE SODIUM 0.05 MG/1
50 TABLET ORAL DAILY
Qty: 30 TABLET | Refills: 0 | Status: SHIPPED | OUTPATIENT
Start: 2021-10-19 | End: 2021-11-19

## 2021-10-19 RX ORDER — ATORVASTATIN CALCIUM 10 MG/1
10 TABLET, FILM COATED ORAL DAILY
Qty: 30 TABLET | Refills: 0 | Status: SHIPPED | OUTPATIENT
Start: 2021-10-19 | End: 2021-11-19

## 2021-10-19 NOTE — TELEPHONE ENCOUNTER
Last OV 9/14/20  Requesting refills on levothyroxine, atorvastatin thru sure script  Patient is due for an apt  Message left for patient

## 2021-11-19 DIAGNOSIS — E78.2 MIXED HYPERLIPIDEMIA: ICD-10-CM

## 2021-11-19 RX ORDER — ATORVASTATIN CALCIUM 10 MG/1
10 TABLET, FILM COATED ORAL DAILY
Qty: 30 TABLET | Refills: 0 | Status: SHIPPED | OUTPATIENT
Start: 2021-11-19 | End: 2021-12-17

## 2021-11-19 RX ORDER — LEVOTHYROXINE SODIUM 0.05 MG/1
50 TABLET ORAL DAILY
Qty: 30 TABLET | Refills: 0 | Status: SHIPPED | OUTPATIENT
Start: 2021-11-19 | End: 2021-12-17

## 2021-11-19 NOTE — TELEPHONE ENCOUNTER
Last OV 9/20 for check up  Last TSH 9/4/20 was 4.84  Requesting refill on levothyroxine and atorvastatin thru sure script  Next OV 12/9/21  Patient does need these medications.

## 2021-12-09 ENCOUNTER — HOSPITAL ENCOUNTER (OUTPATIENT)
Age: 71
Discharge: HOME | End: 2021-12-09
Payer: MEDICARE

## 2021-12-09 ENCOUNTER — OFFICE VISIT (OUTPATIENT)
Dept: FAMILY MEDICINE CLINIC | Age: 71
End: 2021-12-09
Payer: MEDICARE

## 2021-12-09 VITALS
DIASTOLIC BLOOD PRESSURE: 68 MMHG | HEIGHT: 67 IN | RESPIRATION RATE: 18 BRPM | HEART RATE: 78 BPM | WEIGHT: 247.8 LBS | OXYGEN SATURATION: 95 % | BODY MASS INDEX: 38.89 KG/M2 | SYSTOLIC BLOOD PRESSURE: 126 MMHG

## 2021-12-09 DIAGNOSIS — Z96.611 HISTORY OF ARTHROPLASTY OF RIGHT SHOULDER: ICD-10-CM

## 2021-12-09 DIAGNOSIS — E78.2 MIXED HYPERLIPIDEMIA: ICD-10-CM

## 2021-12-09 DIAGNOSIS — K63.5 POLYP OF COLON, UNSPECIFIED PART OF COLON, UNSPECIFIED TYPE: ICD-10-CM

## 2021-12-09 DIAGNOSIS — B07.8 COMMON WART: ICD-10-CM

## 2021-12-09 DIAGNOSIS — Z00.00 ROUTINE GENERAL MEDICAL EXAMINATION AT A HEALTH CARE FACILITY: Primary | ICD-10-CM

## 2021-12-09 DIAGNOSIS — Z87.891 PERSONAL HISTORY OF TOBACCO USE: ICD-10-CM

## 2021-12-09 DIAGNOSIS — Z13.6 SCREENING FOR AAA (ABDOMINAL AORTIC ANEURYSM): ICD-10-CM

## 2021-12-09 DIAGNOSIS — E03.9 ACQUIRED HYPOTHYROIDISM: ICD-10-CM

## 2021-12-09 DIAGNOSIS — F17.210 CIGARETTE NICOTINE DEPENDENCE WITHOUT COMPLICATION: ICD-10-CM

## 2021-12-09 DIAGNOSIS — Z13.220 SCREENING FOR HYPERLIPIDEMIA: ICD-10-CM

## 2021-12-09 DIAGNOSIS — Z28.20 VACCINE REFUSED BY PATIENT: ICD-10-CM

## 2021-12-09 DIAGNOSIS — Z71.89 ACP (ADVANCE CARE PLANNING): ICD-10-CM

## 2021-12-09 DIAGNOSIS — Z12.31 ENCOUNTER FOR SCREENING MAMMOGRAM FOR MALIGNANT NEOPLASM OF BREAST: ICD-10-CM

## 2021-12-09 DIAGNOSIS — Z12.11 SCREENING FOR COLON CANCER: ICD-10-CM

## 2021-12-09 DIAGNOSIS — Z12.2 SCREENING FOR LUNG CANCER: ICD-10-CM

## 2021-12-09 DIAGNOSIS — E66.01 SEVERE OBESITY (BMI 35.0-35.9 WITH COMORBIDITY) (HCC): ICD-10-CM

## 2021-12-09 LAB
ALBUMIN SERPL-MCNC: 3.9 G/DL (ref 3.5–5.2)
ALBUMIN/GLOBULIN RATIO: ABNORMAL (ref 1–2.5)
ALP BLD-CCNC: 78 U/L (ref 35–104)
ALT SERPL-CCNC: 23 U/L (ref 5–33)
ANION GAP SERPL CALCULATED.3IONS-SCNC: 9 MMOL/L (ref 9–17)
AST SERPL-CCNC: 18 U/L
BILIRUB SERPL-MCNC: 0.38 MG/DL (ref 0.3–1.2)
BUN BLDV-MCNC: 16 MG/DL (ref 8–23)
BUN/CREAT BLD: 28 (ref 9–20)
CALCIUM SERPL-MCNC: 9.1 MG/DL (ref 8.6–10.4)
CHLORIDE BLD-SCNC: 103 MMOL/L (ref 98–107)
CHOLESTEROL/HDL RATIO: 2.8
CHOLESTEROL: 147 MG/DL
CO2: 25 MMOL/L (ref 20–31)
CREAT SERPL-MCNC: 0.57 MG/DL (ref 0.5–0.9)
GFR AFRICAN AMERICAN: >60 ML/MIN
GFR NON-AFRICAN AMERICAN: >60 ML/MIN
GFR SERPL CREATININE-BSD FRML MDRD: ABNORMAL ML/MIN/{1.73_M2}
GFR SERPL CREATININE-BSD FRML MDRD: ABNORMAL ML/MIN/{1.73_M2}
GLUCOSE BLD-MCNC: 99 MG/DL (ref 70–99)
HDLC SERPL-MCNC: 52 MG/DL
LDL CHOLESTEROL: 79 MG/DL (ref 0–130)
PATIENT FASTING?: YES
POTASSIUM SERPL-SCNC: 4.7 MMOL/L (ref 3.7–5.3)
SODIUM BLD-SCNC: 137 MMOL/L (ref 135–144)
THYROXINE, FREE: 1 NG/DL (ref 0.93–1.7)
TOTAL PROTEIN: 6.8 G/DL (ref 6.4–8.3)
TRIGL SERPL-MCNC: 79 MG/DL
TSH SERPL DL<=0.05 MIU/L-ACNC: 5.24 MIU/L (ref 0.3–5)
VLDLC SERPL CALC-MCNC: NORMAL MG/DL (ref 1–30)

## 2021-12-09 PROCEDURE — 3017F COLORECTAL CA SCREEN DOC REV: CPT | Performed by: STUDENT IN AN ORGANIZED HEALTH CARE EDUCATION/TRAINING PROGRAM

## 2021-12-09 PROCEDURE — 80053 COMPREHEN METABOLIC PANEL: CPT

## 2021-12-09 PROCEDURE — 1123F ACP DISCUSS/DSCN MKR DOCD: CPT | Performed by: STUDENT IN AN ORGANIZED HEALTH CARE EDUCATION/TRAINING PROGRAM

## 2021-12-09 PROCEDURE — 36415 COLL VENOUS BLD VENIPUNCTURE: CPT

## 2021-12-09 PROCEDURE — 80061 LIPID PANEL: CPT

## 2021-12-09 PROCEDURE — G0439 PPPS, SUBSEQ VISIT: HCPCS | Performed by: STUDENT IN AN ORGANIZED HEALTH CARE EDUCATION/TRAINING PROGRAM

## 2021-12-09 PROCEDURE — G8484 FLU IMMUNIZE NO ADMIN: HCPCS | Performed by: STUDENT IN AN ORGANIZED HEALTH CARE EDUCATION/TRAINING PROGRAM

## 2021-12-09 PROCEDURE — G0296 VISIT TO DETERM LDCT ELIG: HCPCS | Performed by: STUDENT IN AN ORGANIZED HEALTH CARE EDUCATION/TRAINING PROGRAM

## 2021-12-09 PROCEDURE — 4040F PNEUMOC VAC/ADMIN/RCVD: CPT | Performed by: STUDENT IN AN ORGANIZED HEALTH CARE EDUCATION/TRAINING PROGRAM

## 2021-12-09 PROCEDURE — 84439 ASSAY OF FREE THYROXINE: CPT

## 2021-12-09 PROCEDURE — 99497 ADVNCD CARE PLAN 30 MIN: CPT | Performed by: STUDENT IN AN ORGANIZED HEALTH CARE EDUCATION/TRAINING PROGRAM

## 2021-12-09 PROCEDURE — 84443 ASSAY THYROID STIM HORMONE: CPT

## 2021-12-09 SDOH — ECONOMIC STABILITY: FOOD INSECURITY: WITHIN THE PAST 12 MONTHS, YOU WORRIED THAT YOUR FOOD WOULD RUN OUT BEFORE YOU GOT MONEY TO BUY MORE.: NEVER TRUE

## 2021-12-09 SDOH — ECONOMIC STABILITY: FOOD INSECURITY: WITHIN THE PAST 12 MONTHS, THE FOOD YOU BOUGHT JUST DIDN'T LAST AND YOU DIDN'T HAVE MONEY TO GET MORE.: NEVER TRUE

## 2021-12-09 ASSESSMENT — SOCIAL DETERMINANTS OF HEALTH (SDOH): HOW HARD IS IT FOR YOU TO PAY FOR THE VERY BASICS LIKE FOOD, HOUSING, MEDICAL CARE, AND HEATING?: NOT HARD AT ALL

## 2021-12-09 ASSESSMENT — PATIENT HEALTH QUESTIONNAIRE - PHQ9
SUM OF ALL RESPONSES TO PHQ QUESTIONS 1-9: 0
SUM OF ALL RESPONSES TO PHQ9 QUESTIONS 1 & 2: 0
SUM OF ALL RESPONSES TO PHQ QUESTIONS 1-9: 0
2. FEELING DOWN, DEPRESSED OR HOPELESS: 0
SUM OF ALL RESPONSES TO PHQ QUESTIONS 1-9: 0
1. LITTLE INTEREST OR PLEASURE IN DOING THINGS: 0

## 2021-12-09 ASSESSMENT — LIFESTYLE VARIABLES: HOW OFTEN DO YOU HAVE A DRINK CONTAINING ALCOHOL: 0

## 2021-12-09 NOTE — PROGRESS NOTES
Advance Care Planning   Advanced Care Planning: Discussed the patients choices for care and treatment in case of a health event that adversely affects decision-making abilities. Also discussed the patients long-term treatment options. Reviewed with the patient the [facility] Declaration forms  Reviewed the process of designating a competent adult as an Agent (or -in-fact) that could take make health care decisions for the patient if incompetent. Patient was asked to complete the declaration forms, either acknowledge the forms by a public notary or an eligible witness and provide a signed copy to the practice office. Time spent (minutes): 12     LDCT Screening: Discussed with patient the benefits and harms of screening, follow-up diagnostic testing, over-diagnosis, false positive rate, and total radiation exposure. Counseled on the importance of adherence to annual lung cancer LDCT screening, impact of comorbidities, ability and willingness to undergo diagnosis and treatment. Counseled on the importance of maintaining cigarette smoking abstinence and cessation. Patient has a history of heavy tobacco use of over 30 pack years. Patient does not present signs or symptoms of lung cancer. Medicare Annual Wellness Visit  Name: Renae [place] Date: 2021   MRN: [id number] Sex: Female   Age: 80 y.o. Ethnicity: Non- / Non    : 1950 Race: White (non-)      Thomasina Phoenix is here for Medicare AWV, Hypertension, Verruca Vulgaris, and Hypothyroidism    Screenings for behavioral, psychosocial and functional/safety risks, and cognitive dysfunction are all negative except as indicated below. These results, as well as other patient data from the [address] form, are documented in Flowsheets linked to this Encounter. Patient would also like to discuss a common wart on the left third digit, medial aspect of the DIP.   This is been there for several years, and has a large callus overlying the wart. She has attempted to shave and freeze it herself several times without success and would like this procedure done at our office. No Known Allergies      Prior to Visit Medications    Medication Sig Taking? Authorizing Provider   atorvastatin (LIPITOR) 10 MG tablet take 1 tablet by mouth daily Yes SELENE Chanel CNP   levothyroxine (SYNTHROID) 50 MCG tablet take 1 tablet by mouth daily Yes SELENE Chanel CNP         Past Medical History:   Diagnosis Date    Hyperlipidemia     Thyroid disease     Venous ulcer ([facility] [address]     history       Past Surgical History:   Procedure Laterality Date    APPENDECTOMY  09/2018    ruptured appendix    BLADDER SUSPENSION      COLONOSCOPY      HEMORRHOID SURGERY      HYSTERECTOMY      TUBAL LIGATION      VEIN SURGERY      Lt leg \"vein removed\"         Family History   Problem Relation Age of Onset    Stroke Maternal Grandmother     Stroke Paternal Grandmother     No Known Problems Mother     No Known Problems Father        CareTeam (Including outside providers/suppliers regularly involved in providing care):   Patient Care Team:  SELENE Chanel CNP as PCP - General (Nurse Practitioner)  SELENE Chanel CNP as PCP - [facility] Empaneled Provider  Cammy Gilliland MD (Obstetrics & Gynecology)    Wt Readings from Last 3 Encounters:   12/09/21 247 lb 12.8 oz (112.4 kg)   09/14/20 256 lb (116.1 kg)   09/04/19 255 lb (115.7 kg)     Vitals:    12/09/21 0829   BP: 126/68   Site: Left Upper Arm   Position: Sitting   Cuff Size: Large Adult   Pulse: 78   Resp: 18   SpO2: 95%   Weight: 247 lb 12.8 oz (112.4 kg)   Height: 5' 6.9\" (1.699 m)     Body mass index is 38.93 kg/m². Based upon direct observation of the patient, evaluation of cognition reveals recent and remote memory intact.     General Appearance: alert and oriented to person, place and time, well developed and well- nourished, in no acute distress  Skin: warm and dry, no rash or erythema  Head: normocephalic and atraumatic  Eyes: pupils equal, round, and reactive to light, extraocular eye movements intact, conjunctivae normal  ENT: tympanic membrane, external ear and ear canal normal bilaterally, nose without deformity, nasal mucosa and turbinates normal without polyps  Neck: supple and non-tender without mass, no thyromegaly or thyroid nodules, no cervical lymphadenopathy  Pulmonary/Chest: clear to auscultation bilaterally- no wheezes, rales or rhonchi, normal air movement, no respiratory distress  Cardiovascular: normal rate, regular rhythm, normal S1 and S2, no murmurs, rubs, clicks, or gallops, distal pulses intact, no carotid bruits  Abdomen: soft, non-tender, non-distended, normal bowel sounds, no masses or organomegaly  Extremities: no cyanosis, clubbing or edema  Musculoskeletal: normal range of motion, no joint swelling, deformity or tenderness  Neurologic: reflexes normal and symmetric, no cranial nerve deficit, gait, coordination and speech normal  Common wart as described in HPI    Patient's complete Health Risk Assessment and screening values have been reviewed and are found in Flowsheets. The following problems were reviewed today and where indicated follow up appointments were made and/or referrals ordered. Positive Risk Factor Screenings with Interventions:         Substance History:  Social History     Tobacco History     Smoking Status  Current Every Day Smoker Smoking Frequency  1 pack/day for 30 years (30 pk yrs)    Smokeless Tobacco Use  Never Used          Alcohol History     Alcohol Use Status  No          Drug Use     Drug Use Status  No          Sexual Activity     Sexually Active  Not Asked               Alcohol Screening:       A score of 8 or more is associated with harmful or hazardous drinking.  A score of 13 or more in women, and 15 or more in men, is likely to indicate alcohol dependence. Substance Abuse Interventions:  · Tobacco abuse:  patient is not ready to work toward tobacco cessation at this time    [address] and ACP:  General  In general, how would you say your health is?: Good  In the past 7 days, have you experienced any of the following?  New or Increased Pain, New or Increased Fatigue, Loneliness, Social Isolation, Stress or Anger?: None of These  Do you get the social and emotional support that you need?: Yes  Do you have a Living Will?: (!) No  Advance Directives     Power of [address] Will ACP-Advance Directive ACP-Power of     Not on File Not on File Not on File Not on File      General Health Risk Interventions:  · No Living Will: Advance Care Planning addressed with patient today    Health Habits/Nutrition:  Health Habits/Nutrition  Do you exercise for at least 20 minutes 2-3 times per week?: (!) No  Have you lost any weight without trying in the past 3 months?: No  Do you eat only one meal per day?: No  Have you seen the dentist within the past year?: Yes  Body mass index: (!) 38.92  Health Habits/Nutrition Interventions:  · Inadequate physical activity:  patient is not ready to increase his/her physical activity level at this time    Hearing/Vision:  No exam data present  Hearing/Vision  Do you or your family notice any trouble with your hearing that hasn't been managed with hearing aids?: No  Do you have difficulty driving, watching TV, or doing any of your daily activities because of your eyesight?: (!) Yes  Have you had an eye exam within the past year?: (!) No  Hearing/Vision Interventions:  · patient states she has no problems    Safety:  Safety  Do you have working smoke detectors?: Yes  Have all throw rugs been removed or fastened?: Yes  Do you have non-slip mats or surfaces in all bathtubs/showers?: Yes  Do all of your stairways have a railing or banister?: (!) No  Are your doorways, halls and stairs free of clutter?: Yes  Do you always fasten your seatbelt when you are in a car?: Yes  Safety Interventions:  · Home safety tips provided     Personalized Preventive Plan   Current Health Maintenance Status  Immunization History   Administered Date(s) Administered    Pneumococcal Conjugate 13-valent (Noreene Hoof) 11/28/2018, 07/14/2020    Tdap (Boostrix, Adacel) 06/10/2013        Health Maintenance   Topic Date Due    COVID-19 Vaccine (1) Never done    Shingles Vaccine (1 of 2) Never done    Low dose CT lung screening  Never done    Colon cancer screen colonoscopy  10/30/2017    Annual Wellness Visit (AWV)  09/04/2020    Diabetes screen  02/20/2021    Flu vaccine (1) Never done    Lipid screen  09/04/2021    Breast cancer screen  09/04/2021    TSH testing  09/04/2021    DTaP/Tdap/Td vaccine (2 - Td or Tdap) 06/10/2023    DEXA (modify frequency per FRAX score)  Completed    Pneumococcal 65+ years Vaccine  Completed    Hepatitis A vaccine  Aged Out    Hepatitis B vaccine  Aged Out    Hib vaccine  Aged Out    Meningococcal (ACWY) vaccine  Aged Out    Hepatitis C screen  Discontinued     Recommendations for [employer] Due: see orders and patient instructions/AVS.  . Recommended screening schedule for the next 5-10 years is provided to the patient in written form: see Patient Instructions/AVS.    Renata Knife was seen today for medicare awv, hypertension, verruca vulgaris and hypothyroidism. Diagnoses and all orders for this visit:    Routine general medical examination at a health care facility    ACP (advance care planning)  -     [address], [address] [[id number]]  Discussed living [place] and healthcare power of  with patient, gave her paperwork to complete, directed her to return paperwork once it has been completed. Screening for hyperlipidemia  -     Lipid Panel;  Future    Screening for AAA (abdominal aortic aneurysm)  Patient states she would like 7 days to consider having this procedure ordered, I feel this is reasonable. Screening for lung cancer  Patient states she would like 7 days to consider having this procedure ordered, I feel this is reasonable. Personal history of tobacco use  -     OH VISIT TO DISCUSS LUNG CA SCREEN W LDCT []    Screening for colon cancer  Patient does have a history of BRBPR, as well as a colon polyps. She has had a colon scope in 2012, and has not had one since. She has completed several FIT tests, which I would not recommend for further screening since she does have a positive history of colon polyps. She is also not a candidate for Cologuard. I did enumerate the benefits of screening with a diagnostic colon scope, since this is recommended for her, and she declines. Encounter for screening mammogram for malignant neoplasm of breast  Patient declined screening with mammogram stating \"I will think I need it\". She is unable to give me a reason why she does not believe she would benefit from this screening. I reiterated that this was a strongly recommended screening, and she declines. History of arthroplasty of right shoulder    Vaccine refused by patient    Severe obesity (BMI 35.0-35.9 with comorbidity) (HCC)    Acquired hypothyroidism  -     TSH With Reflex Ft4; Future    Polyp of colon, unspecified part of colon, unspecified type    Mixed hyperlipidemia  -     Lipid Panel; Future  -     Comprehensive Metabolic Panel; Future    Common wart  We will see the patient back in 1 week for wart removal, I anticipate cryosurgery, may consider electrocautery. Cigarette nicotine dependence without complication  Precontemplation regarding cessation at this time.

## 2021-12-09 NOTE — PATIENT INSTRUCTIONS
SURVEY:    You may be receiving a survey from [employer] regarding your visit today. Please complete the survey to enable us to provide the highest quality of care to you and your family. If you cannot score us a very good on any question, please call the office to discuss how we could of made your experience a very good one. Thank you. Clinical Care Team:     Dr. Grace Friday, DARLINE ChoudharyTeam:     Perri Trinh       Advance Directives: Care Instructions  Overview  An advance directive is a legal way to state your wishes at the end of your life. It tells your family and your doctor what to do if you can't say what you want. There are two main types of advance directives. You can change them any time your wishes change. Living will. This form tells your family and your doctor your wishes about life support and other treatment. The form is also called a declaration. Medical power of . This form lets you name a person to make treatment decisions for you when you can't speak for yourself. This person is called a health care agent (health care proxy, health care surrogate). The form is also called a durable power of  for health care. If you do not have an advance directive, decisions about your medical care may be made by a family member, or by a doctor or a  who doesn't know you. It may help to think of an advance directive as a gift to the people who care for you. If you have one, they won't have to make tough decisions by themselves. Follow-up care is a key part of your treatment and safety. Be sure to make and go to all appointments, and call your doctor if you are having problems. It's also a good idea to know your test results and keep a list of the medicines you take. What should you include in an advance directive? Many states have a unique advance directive form.  (It may ask you to address specific issues.) Or you might use a universal form that's approved by many states. If your form doesn't tell you what to address, it may be hard to know what to include in your advance directive. Use the questions below to help you get started. · Who do you want to make decisions about your medical care if you are not able to? · What life-support measures do you want if you have a serious illness that gets worse over time or can't be cured? · What are you most afraid of that might happen? (Maybe you're afraid of having pain, losing your independence, or being kept alive by machines.)  · Where would you prefer to die? (Your home? A hospital? A nursing home?)  · Do you want to donate your organs when you die? · Do you want certain Rastafarian practices performed before you die? When should you call for help? Be sure to contact your doctor if you have any questions. Where can you learn more? Go to https://Surefield.Plazapoints (Cuponium). org and sign in to your Bloompop account. Enter R264 in the [employer] box to learn more about \"Advance Directives: Care Instructions. \"     If you do not have an account, please click on the \"Sign Up Now\" link. Current as of: March 17, 2021               Content Version: 13.0  © 3500-1086 Healthwise, Incorporated. Care instructions adapted under license by [facility] ([facility]). If you have questions about a medical condition or this instruction, always ask your healthcare professional. [place] [address] any warranty or liability for your use of this information. Learning About Medical Power of   What is a medical power of ? A medical power of , also called a durable power of  for health care, is one type of the legal forms called advance directives. It lets you name the person you want to make treatment decisions for you if you can't speak or decide for yourself. The person you choose is called your health care agent. This person is also called a health care proxy or health care surrogate. A medical power of  may be called something else in your state. How do you choose a health care agent? Choose your health care agent carefully. This person may or may not be a family member. Talk to the person before you make your final decision. Make sure he or she is comfortable with this responsibility. It's a good idea to choose someone who:  · Is at least 25years old. · Knows you well and understands what makes life meaningful for you. · Understands your Restoration and moral values. · Will do what you want, not what he or she wants. · Will be able to make difficult choices at a stressful time. · Will be able to refuse or stop treatment, if that is what you would want, even if you could die. · Will be firm and confident with health professionals if needed. · Will ask questions to get needed information. · Lives near you or agrees to travel to you if needed. Your family may help you make medical decisions while you can still be part of that process. But it's important to choose one person to be your health care agent in case you aren't able to make decisions for yourself. If you don't fill out the legal form and name a health care agent, the decisions your family can make may be limited. A health care agent may be called something else in your state. Who will make decisions for you if you don't have a health care agent? If you don't have a health care agent or a living will, you may not get the care you want. Decisions may be made by family members who disagree about your medical care. Or decisions may be made by a medical professional who doesn't know you well. In some cases, a  makes the decisions. When you name a health care agent, it is very clear who has the power to make health decisions for you. How do you name a health care agent? You name your health care agent on a legal form.  This form is usually called a medical power of . Ask your hospital, state bar association, or office on aging where to find these forms. You must sign the form to make it legal. Some states require you to get the form notarized. This means that a person called a  watches you sign the form and then he or she signs the form. Some states also require that two or more witnesses sign the form. Be sure to tell your family members and doctors who your health care agent is. Where can you learn more? Go to https://chpepiceweb.Valderm. org and sign in to your Inline.me account. Enter [id number] in the AirXP box to learn more about \"Learning About [address]. \"     If you do not have an account, please click on the \"Sign Up Now\" link. Current as of: March 17, 2021               Content Version: 13.0  © 3824-9914 [employer]. Care instructions adapted under license by [facility] ([facility]). If you have questions about a medical condition or this instruction, always ask your healthcare professional. [address] any warranty or liability for your use of this information. Learning About Marquise Dennis  What is a living will? A living will, also called a declaration, is a legal form. It tells your family and your doctor your wishes when you can't speak for yourself. It's used by the health professionals who will treat you as you near the end of your life or if you get seriously hurt or ill. If you put your wishes in writing, your loved ones and others will know what kind of care you want. They won't need to guess. This can ease your mind and be helpful to others. And you can change or cancel your living will at any time. A living will is not the same as an estate or property will. An estate will explains what you want to happen with your money and property after you die. How do you use it?   A living will is used to describe the kinds of treatment or life support you want as you near the end of your life or if you get seriously hurt or ill. Keep these facts in mind about living jauregui. · Your living will is used only if you can't speak or make decisions for yourself. Most often, one or more doctors must certify that you can't speak or decide for yourself before your living will takes effect. · If you get better and can speak for yourself again, you can accept or refuse any treatment. It doesn't matter what you said in your living will. · Some states may limit your right to refuse treatment in certain cases. For example, you may need to clearly state in your living will that you don't want artificial hydration and nutrition, such as being fed through a tube. Is a living will a legal document? A living will is a legal document. Each state has its own laws about living jauregui. And a living will may be called something else in your state. Here are some things to know about living jauregui. · You don't need an  to complete a living will. But legal advice can be helpful if your state's laws are unclear. It can also help if your health history is complicated or your family can't agree on what should be in your living will. · You can change your living will at any time. Some people find that their wishes about end-of-life care change as their health changes. If you make big changes to your living will, complete a new form. · If you move to another state, make sure that your living will is legal in the state where you now live. In most cases, doctors will respect your wishes even if you have a form from a different state. · You might use a universal form that has been approved by many states. This kind of form can sometimes be filled out and stored online. Your digital copy will then be available wherever you have a connection to the internet. The doctors and nurses who need to treat you can find it right away. · Your state may offer an online registry. \"Learning About Living Marika Palomares. \"     If you do not have an account, please click on the \"Sign Up Now\" link. Current as of: March 17, 2021               Content Version: 13.0  © 2006-2021 Healthwise, Incorporated. Care instructions adapted under license by [facility] ([facility]). If you have questions about a medical condition or this instruction, always ask your healthcare professional. [address] any warranty or liability for your use of this information. Stopping Smoking: Care Instructions  Your Care Instructions     Cigarette smokers crave the nicotine in cigarettes. Giving it up is much harder than simply changing a habit. Your body has to stop craving the nicotine. It is hard to quit, but you can do it. There are many tools that people use to quit smoking. You may find that combining tools works best for you. There are several steps to quitting. First you get ready to quit. Then you get support to help you. After that, you learn new skills and behaviors to become a nonsmoker. For many people, a necessary step is getting and using medicine. Your doctor will help you set up the plan that best meets your needs. You may want to attend a smoking cessation program to help you quit smoking. When you choose a program, look for one that has proven success. Ask your doctor for ideas. You will greatly increase your chances of success if you take medicine as well as get counseling or join a cessation program.  Some of the changes you feel when you first quit tobacco are uncomfortable. Your body will miss the nicotine at first, and you may feel short-tempered and grumpy. You may have trouble sleeping or concentrating. Medicine can help you deal with these symptoms. You may struggle with changing your smoking habits and rituals. The last step is the tricky one: Be prepared for the smoking urge to continue for a time. This is a lot to deal with, but keep at it. You will feel better.   Follow-up care is a key part of your treatment and safety. Be sure to make and go to all appointments, and call your doctor if you are having problems. It's also a good idea to know your test results and keep a list of the medicines you take. How can you care for yourself at home? · Ask your family, friends, and coworkers for support. You have a better chance of quitting if you have help and support. · Join a support group, such as Nicotine Anonymous, for people who are trying to quit smoking. · Consider signing up for a smoking cessation program, such as the American Lung Association's Freedom from Smoking program.  · Get text messaging support. Go to the website at www.smokefree. gov to sign up for the [facility] program.  · Set a quit date. Pick your date carefully so that it is not right in the middle of a big deadline or stressful time. Once you quit, do not even take a puff. Get rid of all ashtrays and lighters after your last cigarette. Clean your house and your clothes so that they do not smell of smoke. · Learn how to be a nonsmoker. Think about ways you can avoid those things that make you reach for a cigarette. ? Avoid situations that put you at greatest risk for smoking. For some people, it is hard to have a drink with friends without smoking. For others, they might skip a coffee break with coworkers who smoke. ? Change your daily routine. Take a different route to work or eat a meal in a different place. · Cut down on stress. Calm yourself or release tension by doing an activity you enjoy, such as reading a book, taking a hot bath, or gardening. · Talk to your doctor or pharmacist about nicotine replacement therapy, which replaces the nicotine in your body. You still get nicotine but you do not use tobacco. Nicotine replacement products help you slowly reduce the amount of nicotine you need. These products come in several forms, many of them available over-the-counter:  ? Nicotine patches  ?  Nicotine gum and lozenges  ? Nicotine inhaler  · Ask your doctor about bupropion (Wellbutrin) or varenicline (Chantix), which are prescription medicines. They do not contain nicotine. They help you by reducing withdrawal symptoms, such as stress and anxiety. · Some people find hypnosis, acupuncture, and massage helpful for ending the smoking habit. · Eat a healthy diet and get regular exercise. Having healthy habits will help your body move past its craving for nicotine. · Be prepared to keep trying. Most people are not successful the first few times they try to quit. Do not get mad at yourself if you smoke again. Make a list of things you learned and think about when you want to try again, such as next week, next month, or next year. Where can you learn more? Go to https://Larada Sciences.CoolHotNot Corporation. org and sign in to your basno account. Enter Q414 in the SkyDox box to learn more about \"Stopping Smoking: Care Instructions. \"     If you do not have an account, please click on the \"Sign Up Now\" link. Current as of: February 11, 2021               Content Version: 13.0  © 0322-6559 [employer]. Care instructions adapted under license by [facility] ([facility]). If you have questions about a medical condition or this instruction, always ask your healthcare professional. [address] any warranty or liability for your use of this information. Learning About Benefits From Quitting Smoking  How does quitting smoking make you healthier? If you're thinking about quitting smoking, you may have a few reasons to be smoke-free. Your health may be one of them. · When you quit smoking, you lower your risks for cancer, lung disease, heart attack, stroke, blood vessel disease, and blindness from macular degeneration. · When you're smoke-free, you get sick less often, and you heal faster. You are less likely to get colds, flu, bronchitis, and pneumonia.   · As a nonsmoker, you may find that your mood is better and you are less stressed. When and how will you feel healthier? Quitting has real health benefits that start from day 1 of being smoke-free. And the longer you stay smoke-free, the healthier you get and the better you feel. The first hours  · After just 20 minutes, your blood pressure and heart rate go down. That means there's less stress on your heart and blood vessels. · Within 12 hours, the level of carbon monoxide in your blood drops back to normal. That makes room for more oxygen. With more oxygen in your body, you may notice that you have more energy than when you smoked. After 2 weeks  · Your lungs start to work better. · Your risk of heart attack starts to drop. After 1 month  · When your lungs are clear, you cough less and breathe deeper, so it's easier to be active. · Your sense of taste and smell return. That means you can enjoy food more than you have since you started smoking. Over the years  · Over the years, your risks of heart disease, heart attack, and stroke are lower. · After 10 years, your risk of dying from lung cancer is cut by about half. And your risk for many other types of cancer is lower too. How would quitting help others in your life? When you quit smoking, you improve the health of everyone who now breathes in your smoke. · Their heart, lung, and cancer risks drop, much like yours. · They are sick less. For babies and small children, living smoke-free means they're less likely to have ear infections, pneumonia, and bronchitis. · If you're a woman who is or will be pregnant someday, quitting smoking means a healthier . · Children who are close to you are less likely to become adult smokers. Where can you learn more? Go to https://tashia.healthFIRE1partners. org and sign in to your Rapid RMS account. Enter [id number] in the Pascal [employer] box to learn more about \"Learning About Benefits From Quitting Smoking. \"     If you do not have an account, please click on the \"Sign Up Now\" link. Current as of: February 11, 2021               Content Version: 13.0  © 2006-2021 Healthwise, [employer]. Care instructions adapted under license by [facility] ([facility]). If you have questions about a medical condition or this instruction, always ask your healthcare professional. [address] any warranty or liability for your use of this information. Personalized Preventive Plan for Fatimah Reid - 12/9/2021  Medicare offers a range of preventive health benefits. Some of the tests and screenings are paid in full while other may be subject to a deductible, co-insurance, and/or copay. Some of these benefits include a comprehensive review of your medical history including lifestyle, illnesses that may run in your family, and various assessments and screenings as appropriate. After reviewing your medical record and screening and assessments performed today your provider may have ordered immunizations, labs, imaging, and/or referrals for you. A list of these orders (if applicable) as well as your Preventive Care list are included within your After Visit Summary for your review. Other Preventive Recommendations:    · A preventive eye exam performed by an eye specialist is recommended every 1-2 years to screen for glaucoma; cataracts, macular degeneration, and other eye disorders. · A preventive dental visit is recommended every 6 months. · Try to get at least 150 minutes of exercise per week or 10,000 steps per day on a pedometer . · Order or download the FREE \"Exercise & Physical Activity: Your Everyday Guide\" from [employer]. Call 2-992.739.2986 or search [employer] online. · You need 8321-9140 mg of calcium and 0494-3537 IU of vitamin D per day.  It is possible to meet your calcium requirement with diet alone, but a vitamin D supplement is usually necessary to meet this goal.  · When exposed to the sun, use a sunscreen that protects against both UVA and UVB radiation with an SPF of 30 or greater. Reapply every 2 to 3 hours or after sweating, drying off with a towel, or swimming. · Always wear a seat belt when traveling in a car. Always wear a helmet when riding a bicycle or motorcycle. Personalized Preventive Plan for Alyssa Kevin - 12/9/2021  Medicare offers a range of preventive health benefits. Some of the tests and screenings are paid in full while other may be subject to a deductible, co-insurance, and/or copay. Some of these benefits include a comprehensive review of your medical history including lifestyle, illnesses that may run in your family, and various assessments and screenings as appropriate. After reviewing your medical record and screening and assessments performed today your provider may have ordered immunizations, labs, imaging, and/or referrals for you. A list of these orders (if applicable) as well as your Preventive Care list are included within your After Visit Summary for your review. Other Preventive Recommendations:    A preventive eye exam performed by an eye specialist is recommended every 1-2 years to screen for glaucoma; cataracts, macular degeneration, and other eye disorders. A preventive dental visit is recommended every 6 months. Try to get at least 150 minutes of exercise per week or 10,000 steps per day on a pedometer . Order or download the FREE \"Exercise & Physical Activity: Your Everyday Guide\" from [employer]. Call 9-680.671.3224 or search [employer] online. You need 5430-1015 mg of calcium and 5903-6137 IU of vitamin D per day. It is possible to meet your calcium requirement with diet alone, but a vitamin D supplement is usually necessary to meet this goal.  When exposed to the sun, use a sunscreen that protects against both UVA and UVB radiation with an SPF of 30 or greater.  Reapply every 2 to 3 hours or after sweating, drying off with a towel, or swimming. Always wear a seat belt when traveling in a car. Always wear a helmet when riding a bicycle or motorcycle.

## 2021-12-10 DIAGNOSIS — E03.9 ACQUIRED HYPOTHYROIDISM: Primary | ICD-10-CM

## 2021-12-16 ENCOUNTER — OFFICE VISIT (OUTPATIENT)
Dept: FAMILY MEDICINE CLINIC | Age: 71
End: 2021-12-16
Payer: MEDICARE

## 2021-12-16 VITALS
HEART RATE: 70 BPM | HEIGHT: 66 IN | SYSTOLIC BLOOD PRESSURE: 130 MMHG | DIASTOLIC BLOOD PRESSURE: 78 MMHG | RESPIRATION RATE: 20 BRPM | WEIGHT: 246.4 LBS | BODY MASS INDEX: 39.6 KG/M2

## 2021-12-16 DIAGNOSIS — B07.8 COMMON WART: Primary | ICD-10-CM

## 2021-12-16 PROCEDURE — 17110 DESTRUCTION B9 LES UP TO 14: CPT | Performed by: STUDENT IN AN ORGANIZED HEALTH CARE EDUCATION/TRAINING PROGRAM

## 2021-12-16 NOTE — PATIENT INSTRUCTIONS
SURVEY:    You may be receiving a survey from [employer] regarding your visit today. Please complete the survey to enable us to provide the highest quality of care to you and your family. If you cannot score us a very good on any question, please call the office to discuss how we could of made your experience a very good one. Thank you.       Clinical Care Team:     Dr. Robb Khan, DARLINE      ClericalTeam:     [id number] [facility]

## 2021-12-16 NOTE — PROGRESS NOTES
Interval history: This is a 67-year-old woman with a common wart on the lateral aspect of the third digit of the right hand. She is presenting to have this removed. Please see my previous office visit note for a full description of this condition, its history, and previous therapies. /78 (Site: Left Upper Arm, Position: Sitting, Cuff Size: Large Adult)   Pulse 70   Resp 20   Ht 5' 6\" (1.676 m)   Wt 246 lb 6.4 oz (111.8 kg)   BMI 39.77 kg/m²     Procedure Note    Risks and benefits of the procedure were explained, informed consent was obtained. Timeout was performed, identifying the correct patient, name, date of birth, surgery, surgical site. The wart was prepped with alcohol in the usual antiseptic manner. A #15 blade was then used to shave the hyperkeratotic top of the wart down to the vascular bed. Cotton swab soaked in liquid nitrogen were then continuously reapplied until a total frost time of 15 seconds had been achieved. The area was then again cleansed with alcohol. Hemostasis was achieved with pressure, and the wound was dressed with bacitracin and a sterile bandage. Patient was instructed in the usual course of healing, as well as signs and symptoms of surgical complications including cellulitis. She was instructed to follow-up as needed for this problem, or if the wart recurs. We will consider electrocautery for future treatment if cryosurgery does not provide sufficient resolution.     Nithin Sharp DO  12/16/2021  8:40 AM

## 2021-12-17 DIAGNOSIS — E78.2 MIXED HYPERLIPIDEMIA: ICD-10-CM

## 2021-12-17 RX ORDER — LEVOTHYROXINE SODIUM 0.05 MG/1
50 TABLET ORAL DAILY
Qty: 30 TABLET | Refills: 5 | Status: SHIPPED | OUTPATIENT
Start: 2021-12-17 | End: 2022-01-27 | Stop reason: SDUPTHER

## 2021-12-17 RX ORDER — ATORVASTATIN CALCIUM 10 MG/1
10 TABLET, FILM COATED ORAL DAILY
Qty: 30 TABLET | Refills: 5 | Status: SHIPPED | OUTPATIENT
Start: 2021-12-17 | End: 2022-01-27 | Stop reason: SDUPTHER

## 2021-12-17 NOTE — TELEPHONE ENCOUNTER
Last OV 12/9/21 for AWV  Last OV 12/16/21 for wart  Requesting refill on levothyroxine and atorvastatin thru sure script

## 2022-01-27 DIAGNOSIS — E78.2 MIXED HYPERLIPIDEMIA: ICD-10-CM

## 2022-01-27 RX ORDER — LEVOTHYROXINE SODIUM 0.05 MG/1
50 TABLET ORAL DAILY
Qty: 30 TABLET | Refills: 5 | Status: SHIPPED | OUTPATIENT
Start: 2022-01-27 | End: 2022-06-20

## 2022-01-27 RX ORDER — ATORVASTATIN CALCIUM 10 MG/1
10 TABLET, FILM COATED ORAL DAILY
Qty: 30 TABLET | Refills: 5 | Status: SHIPPED | OUTPATIENT
Start: 2022-01-27 | End: 2022-06-20

## 2022-01-27 NOTE — TELEPHONE ENCOUNTER
lipitor 10 mg  Synthroid 50 mcg    Last check up 12/9/21. She now goes through [facility] [employer] order. Health Maintenance   Topic Date Due    COVID-19 Vaccine (1) Never done    Shingles Vaccine (1 of 2) Never done    Low dose CT lung screening  Never done    Colon cancer screen colonoscopy  10/30/2017    Flu vaccine (1) Never done    Breast cancer screen  09/04/2021    Lipid screen  12/09/2022    Depression Screen  12/09/2022    TSH testing  12/09/2022    Annual Wellness Visit (AWV)  12/10/2022    DTaP/Tdap/Td vaccine (2 - Td or Tdap) 06/10/2023    DEXA (modify frequency per FRAX score)  Completed    Pneumococcal 65+ years Vaccine  Completed    Hepatitis A vaccine  Aged Out    Hepatitis B vaccine  Aged Out    Hib vaccine  Aged Out    Meningococcal (ACWY) vaccine  Aged Out    Hepatitis C screen  Discontinued             (applicable per patient's age: Cancer Screenings, Depression Screening, Fall Risk Screening, Immunizations)    LDL Cholesterol (mg/dL)   Date Value   12/09/2021 79     LDL Calculated (mg/dL)   Date Value   05/03/2018 88     AST (U/L)   Date Value   12/09/2021 18     ALT (U/L)   Date Value   12/09/2021 23     BUN (mg/dL)   Date Value   12/09/2021 16      (goal A1C is < 7)   (goal LDL is <100) need 30-50% reduction from baseline     BP Readings from Last 3 Encounters:   12/16/21 130/78   12/09/21 126/68   09/14/20 120/68    (goal /80)      All Future Testing planned in [facility]:  Lab Frequency Next Occurrence   TSH With Reflex Ft4 Once 01/21/2022       Next Visit Date:  No future appointments. Patient Active Problem List:     Polyp of colon     Obesity (BMI 30-39. 9)     Elevated blood pressure reading without diagnosis of hypertension     Mixed hyperlipidemia     Acquired hypothyroidism     Morbidly obese (HCC)     Cigarette nicotine dependence without complication     History of arthroplasty of right shoulder

## 2022-05-20 ENCOUNTER — TELEPHONE (OUTPATIENT)
Dept: FAMILY MEDICINE CLINIC | Age: 72
End: 2022-05-20

## 2022-06-18 DIAGNOSIS — E78.2 MIXED HYPERLIPIDEMIA: ICD-10-CM

## 2022-06-20 RX ORDER — LEVOTHYROXINE SODIUM 0.05 MG/1
TABLET ORAL
Qty: 90 TABLET | Refills: 1 | Status: SHIPPED | OUTPATIENT
Start: 2022-06-20

## 2022-06-20 RX ORDER — ATORVASTATIN CALCIUM 10 MG/1
TABLET, FILM COATED ORAL
Qty: 90 TABLET | Refills: 1 | Status: SHIPPED | OUTPATIENT
Start: 2022-06-20

## 2022-06-20 NOTE — TELEPHONE ENCOUNTER
Last OV: 12/16/2021 wart  Last RX:     Next scheduled apt: Visit date not found          surescript requesting a refill

## 2023-01-20 DIAGNOSIS — E78.2 MIXED HYPERLIPIDEMIA: ICD-10-CM

## 2023-01-20 NOTE — TELEPHONE ENCOUNTER
Last OV: 12/16/2021 12/09/21 AWV     Last RX:    Next scheduled apt: Visit date not found          Surescript requesting a refill       Left vm to call and schedule an appointment

## 2023-01-23 RX ORDER — ATORVASTATIN CALCIUM 10 MG/1
TABLET, FILM COATED ORAL
Qty: 90 TABLET | Refills: 1 | Status: SHIPPED | OUTPATIENT
Start: 2023-01-23

## 2023-01-23 RX ORDER — LEVOTHYROXINE SODIUM 0.05 MG/1
TABLET ORAL
Qty: 90 TABLET | Refills: 1 | Status: SHIPPED | OUTPATIENT
Start: 2023-01-23

## 2023-07-27 ENCOUNTER — HOSPITAL ENCOUNTER (OUTPATIENT)
Age: 73
Discharge: HOME | End: 2023-07-27
Payer: COMMERCIAL

## 2023-07-27 ENCOUNTER — OFFICE VISIT (OUTPATIENT)
Dept: FAMILY MEDICINE CLINIC | Age: 73
End: 2023-07-27
Payer: COMMERCIAL

## 2023-07-27 VITALS
DIASTOLIC BLOOD PRESSURE: 60 MMHG | OXYGEN SATURATION: 94 % | HEIGHT: 67 IN | BODY MASS INDEX: 38.92 KG/M2 | HEART RATE: 71 BPM | SYSTOLIC BLOOD PRESSURE: 130 MMHG | WEIGHT: 248 LBS

## 2023-07-27 DIAGNOSIS — E03.9 ACQUIRED HYPOTHYROIDISM: ICD-10-CM

## 2023-07-27 DIAGNOSIS — M54.50 CHRONIC RIGHT-SIDED LOW BACK PAIN WITHOUT SCIATICA: Primary | ICD-10-CM

## 2023-07-27 DIAGNOSIS — Z13.220 SCREENING FOR HYPERLIPIDEMIA: ICD-10-CM

## 2023-07-27 DIAGNOSIS — Z13.31 SCREENING FOR DEPRESSION: ICD-10-CM

## 2023-07-27 DIAGNOSIS — Z13.1 SCREENING FOR DIABETES MELLITUS: ICD-10-CM

## 2023-07-27 DIAGNOSIS — G89.29 CHRONIC RIGHT-SIDED LOW BACK PAIN WITHOUT SCIATICA: Primary | ICD-10-CM

## 2023-07-27 DIAGNOSIS — N39.492 POSTURAL URINARY INCONTINENCE: ICD-10-CM

## 2023-07-27 LAB
ALBUMIN SERPL-MCNC: 3.7 G/DL (ref 3.5–5.2)
ALP SERPL-CCNC: 76 U/L (ref 35–104)
ALT SERPL-CCNC: 12 U/L (ref 5–33)
ANION GAP SERPL CALCULATED.3IONS-SCNC: 10 MMOL/L (ref 9–17)
AST SERPL-CCNC: 13 U/L
BASOPHILS # BLD: 0 K/UL (ref 0–0.2)
BASOPHILS NFR BLD: 1 % (ref 0–2)
BILIRUB SERPL-MCNC: 0.3 MG/DL (ref 0.3–1.2)
BUN SERPL-MCNC: 11 MG/DL (ref 8–23)
BUN/CREAT SERPL: 18 (ref 9–20)
CALCIUM SERPL-MCNC: 9.1 MG/DL (ref 8.6–10.4)
CHLORIDE SERPL-SCNC: 105 MMOL/L (ref 98–107)
CHOLEST SERPL-MCNC: 143 MG/DL
CHOLESTEROL/HDL RATIO: 3.5
CO2 SERPL-SCNC: 26 MMOL/L (ref 20–31)
CREAT SERPL-MCNC: 0.6 MG/DL (ref 0.5–0.9)
DIFFERENTIAL TYPE: YES
EOSINOPHIL # BLD: 0.3 K/UL (ref 0–0.4)
EOSINOPHILS RELATIVE PERCENT: 3 % (ref 0–5)
ERYTHROCYTE [DISTWIDTH] IN BLOOD BY AUTOMATED COUNT: 14.9 % (ref 12.1–15.2)
EST. AVERAGE GLUCOSE BLD GHB EST-MCNC: 123 MG/DL
GFR SERPL CREATININE-BSD FRML MDRD: >60 ML/MIN/1.73M2
GLUCOSE SERPL-MCNC: 97 MG/DL (ref 70–99)
HBA1C MFR BLD: 5.9 % (ref 4–6)
HCT VFR BLD AUTO: 42.3 % (ref 36–46)
HDLC SERPL-MCNC: 41 MG/DL
HGB BLD-MCNC: 14.4 G/DL (ref 12–16)
LDLC SERPL CALC-MCNC: 79 MG/DL (ref 0–130)
LYMPHOCYTES NFR BLD: 2.1 K/UL (ref 1–4.8)
LYMPHOCYTES RELATIVE PERCENT: 25 % (ref 15–40)
MCH RBC QN AUTO: 30.3 PG (ref 26–34)
MCHC RBC AUTO-ENTMCNC: 34 G/DL (ref 31–37)
MCV RBC AUTO: 89.2 FL (ref 80–100)
MONOCYTES NFR BLD: 0.5 K/UL (ref 0–1)
MONOCYTES NFR BLD: 7 % (ref 4–8)
NEUTROPHILS NFR BLD: 64 % (ref 47–75)
NEUTS SEG NFR BLD: 5.2 K/UL (ref 2.5–7)
PATIENT FASTING?: YES
PLATELET # BLD AUTO: 321 K/UL (ref 140–450)
POTASSIUM SERPL-SCNC: 4.5 MMOL/L (ref 3.7–5.3)
PROT SERPL-MCNC: 6.7 G/DL (ref 6.4–8.3)
RBC # BLD AUTO: 4.74 M/UL (ref 4–5.2)
SODIUM SERPL-SCNC: 141 MMOL/L (ref 135–144)
TRIGL SERPL-MCNC: 113 MG/DL
TSH SERPL DL<=0.05 MIU/L-ACNC: 3.94 UIU/ML (ref 0.3–5)
WBC OTHER # BLD: 8.1 K/UL (ref 3.5–11)

## 2023-07-27 PROCEDURE — 36415 COLL VENOUS BLD VENIPUNCTURE: CPT

## 2023-07-27 PROCEDURE — 84443 ASSAY THYROID STIM HORMONE: CPT

## 2023-07-27 PROCEDURE — 80061 LIPID PANEL: CPT

## 2023-07-27 PROCEDURE — 80053 COMPREHEN METABOLIC PANEL: CPT

## 2023-07-27 PROCEDURE — 83036 HEMOGLOBIN GLYCOSYLATED A1C: CPT

## 2023-07-27 PROCEDURE — 85027 COMPLETE CBC AUTOMATED: CPT

## 2023-07-27 PROCEDURE — 1123F ACP DISCUSS/DSCN MKR DOCD: CPT | Performed by: STUDENT IN AN ORGANIZED HEALTH CARE EDUCATION/TRAINING PROGRAM

## 2023-07-27 PROCEDURE — 99214 OFFICE O/P EST MOD 30 MIN: CPT | Performed by: STUDENT IN AN ORGANIZED HEALTH CARE EDUCATION/TRAINING PROGRAM

## 2023-07-27 SDOH — ECONOMIC STABILITY: HOUSING INSECURITY
IN THE LAST 12 MONTHS, WAS THERE A TIME WHEN YOU DID NOT HAVE A STEADY PLACE TO SLEEP OR SLEPT IN A SHELTER (INCLUDING NOW)?: NO

## 2023-07-27 SDOH — ECONOMIC STABILITY: FOOD INSECURITY: WITHIN THE PAST 12 MONTHS, YOU WORRIED THAT YOUR FOOD WOULD RUN OUT BEFORE YOU GOT MONEY TO BUY MORE.: NEVER TRUE

## 2023-07-27 SDOH — ECONOMIC STABILITY: FOOD INSECURITY: WITHIN THE PAST 12 MONTHS, THE FOOD YOU BOUGHT JUST DIDN'T LAST AND YOU DIDN'T HAVE MONEY TO GET MORE.: NEVER TRUE

## 2023-07-27 SDOH — ECONOMIC STABILITY: INCOME INSECURITY: HOW HARD IS IT FOR YOU TO PAY FOR THE VERY BASICS LIKE FOOD, HOUSING, MEDICAL CARE, AND HEATING?: NOT HARD AT ALL

## 2023-07-27 ASSESSMENT — PATIENT HEALTH QUESTIONNAIRE - PHQ9
2. FEELING DOWN, DEPRESSED OR HOPELESS: 0
SUM OF ALL RESPONSES TO PHQ QUESTIONS 1-9: 0
1. LITTLE INTEREST OR PLEASURE IN DOING THINGS: 0
SUM OF ALL RESPONSES TO PHQ9 QUESTIONS 1 & 2: 0
SUM OF ALL RESPONSES TO PHQ QUESTIONS 1-9: 0

## 2023-07-27 ASSESSMENT — ENCOUNTER SYMPTOMS
VOMITING: 0
SINUS PAIN: 0
RHINORRHEA: 0
NAUSEA: 0
ABDOMINAL PAIN: 0
BACK PAIN: 1
COUGH: 0
DIARRHEA: 0
WHEEZING: 0

## 2023-07-27 NOTE — PROGRESS NOTES
09:45 AM     Lab Results   Component Value Date/Time    WBC 9.2 09/04/2020 11:13 AM    RBC 4.95 09/04/2020 11:13 AM    HGB 15.0 09/04/2020 11:13 AM    HCT 44.8 09/04/2020 11:13 AM    MCV 90.6 09/04/2020 11:13 AM    MCH 30.3 09/04/2020 11:13 AM    MCHC 33.5 09/04/2020 11:13 AM    RDW 14.3 09/04/2020 11:13 AM     09/04/2020 11:13 AM    MPV NOT REPORTED 09/04/2020 11:13 AM     Lab Results   Component Value Date/Time    TSH 5.24 12/09/2021 09:45 AM     Lab Results   Component Value Date/Time    CHOL 147 12/09/2021 09:45 AM    CHOL 151 05/03/2018 12:00 AM    HDL 52 12/09/2021 09:45 AM          Assessment & Plan        Diagnosis Orders   1. Chronic right-sided low back pain without sciatica  diclofenac (VOLTAREN) 50 MG EC tablet      2. Postural urinary incontinence  mirabegron (MYRBETRIQ) 25 MG TB24      3. Acquired hypothyroidism  TSH With Reflex Ft4    Comprehensive Metabolic Panel    CBC with Auto Differential      4. Screening for hyperlipidemia  Lipid Panel      5. Screening for diabetes mellitus  Hemoglobin A1C      6. Screening for depression            This appears to be musculoskeletal in nature based on PE. I would recommend NSAID and topical muscle rub, may add qHS muscle relaxer if necessary at next OV. No radiculopathy or signs indicating compression fx or need for imaging at this time. The patient I had a long discussion about starting diclofenac 50 mg PO BID WC. We discussed its mechanism of action, intended goals, adverse effects, as well as common side effects. They were able to verbalize understanding, and repeat plan back to me. Will add Myrbetriq due to excellent safety profile and follow up in 4 weeks. The patient I had a long discussion about starting Myrbetriq 25 mg PO QD, may increase to 50 mg after 7 d if necessary. We discussed its mechanism of action, intended goals, adverse effects, as well as common side effects.   They were able to verbalize understanding, and repeat plan back to

## 2023-07-27 NOTE — PATIENT INSTRUCTIONS
SURVEY:    You may be receiving a survey from [employer] regarding your visit today. You may get this in the mail, through your MyChart or in your email. Please complete the survey to enable us to provide the highest quality of care to you and your family. If you cannot score us as very good ( 5 Stars) on any question, please feel free to call the office to discuss how we could have made your experience exceptional.     Thank you.     Clinical Care Team:  Dr. Merle Monroy, DO Bhargav Wong LPN    Triage:  Eris Simon, [address] [facility] Team:  Tammi Newton

## 2023-08-22 DIAGNOSIS — G89.29 CHRONIC RIGHT-SIDED LOW BACK PAIN WITHOUT SCIATICA: ICD-10-CM

## 2023-08-22 DIAGNOSIS — N39.492 POSTURAL URINARY INCONTINENCE: ICD-10-CM

## 2023-08-22 DIAGNOSIS — M54.50 CHRONIC RIGHT-SIDED LOW BACK PAIN WITHOUT SCIATICA: ICD-10-CM

## 2023-08-22 NOTE — TELEPHONE ENCOUNTER
Patient asking for new scripts for Myrbetric & Diclofenac - patient requesting these to be sent to [address] [place] [address] - patient is running low - scheduled to see Dr Logan Holm on Thursday

## 2023-08-22 NOTE — TELEPHONE ENCOUNTER
Last OV: 7/27/2023  chronic   Last RX:    Next scheduled apt: 8/24/2023   4 week f/u             Surescript requesting a refill

## 2023-08-24 ENCOUNTER — OFFICE VISIT (OUTPATIENT)
Dept: FAMILY MEDICINE CLINIC | Age: 73
End: 2023-08-24
Payer: COMMERCIAL

## 2023-08-24 VITALS
OXYGEN SATURATION: 93 % | DIASTOLIC BLOOD PRESSURE: 70 MMHG | WEIGHT: 253 LBS | SYSTOLIC BLOOD PRESSURE: 136 MMHG | BODY MASS INDEX: 39.71 KG/M2 | HEIGHT: 67 IN | HEART RATE: 72 BPM

## 2023-08-24 DIAGNOSIS — Z87.891 PERSONAL HISTORY OF TOBACCO USE: ICD-10-CM

## 2023-08-24 DIAGNOSIS — E78.2 MIXED HYPERLIPIDEMIA: ICD-10-CM

## 2023-08-24 DIAGNOSIS — E03.9 ACQUIRED HYPOTHYROIDISM: ICD-10-CM

## 2023-08-24 DIAGNOSIS — Z13.6 ENCOUNTER FOR SCREENING FOR ABDOMINAL AORTIC ANEURYSM (AAA) IN PATIENT 50 YEARS OF AGE OR OLDER WITH HISTORY OF SMOKING: ICD-10-CM

## 2023-08-24 DIAGNOSIS — G89.29 CHRONIC RIGHT-SIDED LOW BACK PAIN WITHOUT SCIATICA: ICD-10-CM

## 2023-08-24 DIAGNOSIS — Z12.11 SCREENING FOR COLON CANCER: ICD-10-CM

## 2023-08-24 DIAGNOSIS — Z00.00 MEDICARE ANNUAL WELLNESS VISIT, SUBSEQUENT: Primary | ICD-10-CM

## 2023-08-24 DIAGNOSIS — N39.492 POSTURAL URINARY INCONTINENCE: ICD-10-CM

## 2023-08-24 DIAGNOSIS — Z87.891 ENCOUNTER FOR SCREENING FOR ABDOMINAL AORTIC ANEURYSM (AAA) IN PATIENT 50 YEARS OF AGE OR OLDER WITH HISTORY OF SMOKING: ICD-10-CM

## 2023-08-24 DIAGNOSIS — Z53.20 MAMMOGRAM DECLINED: ICD-10-CM

## 2023-08-24 DIAGNOSIS — M54.50 CHRONIC RIGHT-SIDED LOW BACK PAIN WITHOUT SCIATICA: ICD-10-CM

## 2023-08-24 PROCEDURE — G0296 VISIT TO DETERM LDCT ELIG: HCPCS | Performed by: STUDENT IN AN ORGANIZED HEALTH CARE EDUCATION/TRAINING PROGRAM

## 2023-08-24 PROCEDURE — 1123F ACP DISCUSS/DSCN MKR DOCD: CPT | Performed by: STUDENT IN AN ORGANIZED HEALTH CARE EDUCATION/TRAINING PROGRAM

## 2023-08-24 PROCEDURE — G0439 PPPS, SUBSEQ VISIT: HCPCS | Performed by: STUDENT IN AN ORGANIZED HEALTH CARE EDUCATION/TRAINING PROGRAM

## 2023-08-24 SDOH — HEALTH STABILITY: PHYSICAL HEALTH: ON AVERAGE, HOW MANY DAYS PER WEEK DO YOU ENGAGE IN MODERATE TO STRENUOUS EXERCISE (LIKE A BRISK WALK)?: 0 DAYS

## 2023-08-24 ASSESSMENT — PATIENT HEALTH QUESTIONNAIRE - PHQ9
SUM OF ALL RESPONSES TO PHQ QUESTIONS 1-9: 0
1. LITTLE INTEREST OR PLEASURE IN DOING THINGS: 0
SUM OF ALL RESPONSES TO PHQ9 QUESTIONS 1 & 2: 0
SUM OF ALL RESPONSES TO PHQ QUESTIONS 1-9: 0
2. FEELING DOWN, DEPRESSED OR HOPELESS: 0
SUM OF ALL RESPONSES TO PHQ QUESTIONS 1-9: 0
SUM OF ALL RESPONSES TO PHQ QUESTIONS 1-9: 0

## 2023-08-24 ASSESSMENT — LIFESTYLE VARIABLES
HOW OFTEN DO YOU HAVE A DRINK CONTAINING ALCOHOL: 2
HOW OFTEN DO YOU HAVE A DRINK CONTAINING ALCOHOL: MONTHLY OR LESS
HOW MANY STANDARD DRINKS CONTAINING ALCOHOL DO YOU HAVE ON A TYPICAL DAY: 1
HOW MANY STANDARD DRINKS CONTAINING ALCOHOL DO YOU HAVE ON A TYPICAL DAY: 1 OR 2
HOW OFTEN DO YOU HAVE SIX OR MORE DRINKS ON ONE OCCASION: 1

## 2023-08-24 NOTE — PROGRESS NOTES
Medicare Annual Wellness Visit    Fred Vega is here for Medicare AWV, Lower Back Pain (4 week follow up. Symptoms have greatly improved with medication), and Incontinence (Symptoms have started to improve)    Assessment & Plan   Medicare annual wellness visit, subsequent  Mammogram declined  Screening for colon cancer  Comments:  Cologuard ordered  Orders:  -     Fecal DNA Colorectal cancer screening (Cologuard)  Encounter for screening for abdominal aortic aneurysm (AAA) in patient 48years of age or older with history of smoking  -     [place]; Future  Personal history of tobacco use  -     WA VISIT TO DISCUSS LUNG CA SCREEN W LDCT  -     CT Lung Screen (Initial/Annual/Baseline); Future  Acquired hypothyroidism  Mixed hyperlipidemia  Comments:  Doing well on atorvastatin  Postural urinary incontinence  Comments:  Doing well on Myrbetriq  Orders:  -     mirabegron (MYRBETRIQ) 25 MG TB24; Take 1 tablet by mouth daily, Disp-30 tablet, R-11Normal  Chronic right-sided low back pain without sciatica  Comments:  Improved greatly with Voltaren  Orders:  -     diclofenac (VOLTAREN) 50 MG EC tablet; Take 1 tablet by mouth 2 times daily, Disp-11 tablet, R-1Normal    Recommendations for Preventive Services Due: see orders and patient instructions/AVS.  Recommended screening schedule for the next 5-10 years is provided to the patient in written form: see Patient Instructions/AVS.     Return in 6 months (on 2/24/2024) for interval medical exam.     Subjective   The following acute and/or chronic problems were also addressed today:  AAA screening, LDCT screening. Patient's complete Health Risk Assessment and screening values have been reviewed and are found in Flowsheets. The following problems were reviewed today and where indicated follow up appointments were made and/or referrals ordered.     Positive Risk Factor Screenings with Interventions:                 Weight and Activity:  Physical Activity:

## 2023-08-30 DIAGNOSIS — E78.2 MIXED HYPERLIPIDEMIA: ICD-10-CM

## 2023-08-30 RX ORDER — ATORVASTATIN CALCIUM 10 MG/1
TABLET, FILM COATED ORAL
Qty: 90 TABLET | Refills: 1 | Status: SHIPPED | OUTPATIENT
Start: 2023-08-30

## 2023-08-30 RX ORDER — LEVOTHYROXINE SODIUM 0.05 MG/1
TABLET ORAL
Qty: 90 TABLET | Refills: 1 | Status: SHIPPED | OUTPATIENT
Start: 2023-08-30

## 2023-08-30 NOTE — TELEPHONE ENCOUNTER
Last OV: 8/24/2023 AWV   Last RX:    Next scheduled apt: Visit date not found          Surescript requesting a refill

## 2023-09-13 LAB — NONINV COLON CA DNA+OCC BLD SCRN STL QL: NEGATIVE

## 2023-09-20 ENCOUNTER — TELEPHONE (OUTPATIENT)
Dept: ONCOLOGY | Age: 73
End: 2023-09-20

## 2023-09-25 DIAGNOSIS — N39.492 POSTURAL URINARY INCONTINENCE: ICD-10-CM

## 2023-09-25 NOTE — TELEPHONE ENCOUNTER
Can a 30 day go to [employer] in [facility] as well.
Last OV: 8/24/2023 AWV   Last RX:    Next scheduled apt: Visit date not found            Pt requesting a refill
Pt scheduled
Active Problem List:     Polyp of colon     Obesity (BMI 30-39. 9)     Elevated blood pressure reading without diagnosis of hypertension     Mixed hyperlipidemia     Acquired hypothyroidism     Morbidly obese (HCC)     Cigarette nicotine dependence without complication     History of arthroplasty of right shoulder

## 2023-09-27 ENCOUNTER — HOSPITAL ENCOUNTER (OUTPATIENT)
Dept: CT IMAGING | Age: 73
Discharge: HOME | End: 2023-09-29
Attending: STUDENT IN AN ORGANIZED HEALTH CARE EDUCATION/TRAINING PROGRAM
Payer: COMMERCIAL

## 2023-09-27 ENCOUNTER — HOSPITAL ENCOUNTER (OUTPATIENT)
Dept: ULTRASOUND IMAGING | Age: 73
Discharge: HOME | End: 2023-09-29
Attending: STUDENT IN AN ORGANIZED HEALTH CARE EDUCATION/TRAINING PROGRAM
Payer: COMMERCIAL

## 2023-09-27 DIAGNOSIS — Z87.891 PERSONAL HISTORY OF TOBACCO USE: ICD-10-CM

## 2023-09-27 DIAGNOSIS — Z13.6 ENCOUNTER FOR SCREENING FOR ABDOMINAL AORTIC ANEURYSM (AAA) IN PATIENT 50 YEARS OF AGE OR OLDER WITH HISTORY OF SMOKING: ICD-10-CM

## 2023-09-27 DIAGNOSIS — Z87.891 ENCOUNTER FOR SCREENING FOR ABDOMINAL AORTIC ANEURYSM (AAA) IN PATIENT 50 YEARS OF AGE OR OLDER WITH HISTORY OF SMOKING: ICD-10-CM

## 2023-09-27 PROCEDURE — 71271 CT THORAX LUNG CANCER SCR C-: CPT

## 2023-09-27 PROCEDURE — 93976 VASCULAR STUDY: CPT

## 2023-10-06 DIAGNOSIS — M54.50 CHRONIC RIGHT-SIDED LOW BACK PAIN WITHOUT SCIATICA: ICD-10-CM

## 2023-10-06 DIAGNOSIS — G89.29 CHRONIC RIGHT-SIDED LOW BACK PAIN WITHOUT SCIATICA: ICD-10-CM

## 2023-10-06 NOTE — TELEPHONE ENCOUNTER
Last visit:  8/24/2023  Next Visit Date:    Future Appointments   Date Time Provider [address]   2/26/2024  8:00 AM DO Fito Kelly [facility]         Medication List:  Prior to Admission medications    Medication Sig Start Date End Date Taking?  Authorizing Provider   mirabegron (MYRBETRIQ) 25 MG TB24 Take 1 tablet by mouth daily 9/25/23   Pastora Michele DO   levothyroxine (SYNTHROID) 50 MCG tablet TAKE 1 TABLET EVERY DAY 8/30/23   Pastora Michlee DO   atorvastatin (LIPITOR) 10 MG tablet TAKE 1 TABLET EVERY DAY 8/30/23   Pastora Michele DO   diclofenac (VOLTAREN) 50 MG EC tablet Take 1 tablet by mouth 2 times daily 8/24/23   Yuly Mendes DO

## 2023-11-06 ENCOUNTER — TELEPHONE (OUTPATIENT)
Dept: FAMILY MEDICINE CLINIC | Age: 73
End: 2023-11-06

## 2023-11-06 DIAGNOSIS — N39.492 POSTURAL URINARY INCONTINENCE: Primary | ICD-10-CM

## 2023-11-06 RX ORDER — OXYBUTYNIN CHLORIDE 10 MG/1
10 TABLET, EXTENDED RELEASE ORAL DAILY
Qty: 30 TABLET | Refills: 3 | Status: SHIPPED | OUTPATIENT
Start: 2023-11-06

## 2023-11-06 NOTE — TELEPHONE ENCOUNTER
I will switch to oxybutynin, but I picked Myrbetriq because it is safer than oxybutynin. In the elderly, oxybutynin does have a slight increase risk of falls due to dehydration. Keep a close eye on how steady on her feet she feels, stop taking it if she begins to feel unsteady. Azithromycin Pregnancy And Lactation Text: This medication is considered safe during pregnancy and is also secreted in breast milk. High Dose Vitamin A Pregnancy And Lactation Text: High dose vitamin A therapy is contraindicated during pregnancy and breast feeding. Erythromycin Counseling:  I discussed with the patient the risks of erythromycin including but not limited to GI upset, allergic reaction, drug rash, diarrhea, increase in liver enzymes, and yeast infections. Aklief counseling:  Patient advised to apply a pea-sized amount only at bedtime and wait 30 minutes after washing their face before applying.  If too drying, patient may add a non-comedogenic moisturizer.  The most commonly reported side effects including irritation, redness, scaling, dryness, stinging, burning, itching, and increased risk of sunburn.  The patient verbalized understanding of the proper use and possible adverse effects of retinoids.  All of the patient's questions and concerns were addressed. Winlevi Counseling:  I discussed with the patient the risks of topical clascoterone including but not limited to erythema, scaling, itching, and stinging. Patient voiced their understanding. Dapsone Counseling: I discussed with the patient the risks of dapsone including but not limited to hemolytic anemia, agranulocytosis, rashes, methemoglobinemia, kidney failure, peripheral neuropathy, headaches, GI upset, and liver toxicity.  Patients who start dapsone require monitoring including baseline LFTs and weekly CBCs for the first month, then every month thereafter.  The patient verbalized understanding of the proper use and possible adverse effects of dapsone.  All of the patient's questions and concerns were addressed. Benzoyl Peroxide Pregnancy And Lactation Text: This medication is Pregnancy Category C. It is unknown if benzoyl peroxide is excreted in breast milk. Erythromycin Pregnancy And Lactation Text: This medication is Pregnancy Category B and is considered safe during pregnancy. It is also excreted in breast milk. Spironolactone Counseling: Patient advised regarding risks of diarrhea, abdominal pain, hyperkalemia, birth defects (for female patients), liver toxicity and renal toxicity. The patient may need blood work to monitor liver and kidney function and potassium levels while on therapy. The patient verbalized understanding of the proper use and possible adverse effects of spironolactone.  All of the patient's questions and concerns were addressed. Topical Clindamycin Counseling: Patient counseled that this medication may cause skin irritation or allergic reactions.  In the event of skin irritation, the patient was advised to reduce the amount of the drug applied or use it less frequently.   The patient verbalized understanding of the proper use and possible adverse effects of clindamycin.  All of the patient's questions and concerns were addressed. Bactrim Counseling:  I discussed with the patient the risks of sulfa antibiotics including but not limited to GI upset, allergic reaction, drug rash, diarrhea, dizziness, photosensitivity, and yeast infections.  Rarely, more serious reactions can occur including but not limited to aplastic anemia, agranulocytosis, methemoglobinemia, blood dyscrasias, liver or kidney failure, lung infiltrates or desquamative/blistering drug rashes. Minocycline Counseling: Patient advised regarding possible photosensitivity and discoloration of the teeth, skin, lips, tongue and gums.  Patient instructed to avoid sunlight, if possible.  When exposed to sunlight, patients should wear protective clothing, sunglasses, and sunscreen.  The patient was instructed to call the office immediately if the following severe adverse effects occur:  hearing changes, easy bruising/bleeding, severe headache, or vision changes.  The patient verbalized understanding of the proper use and possible adverse effects of minocycline.  All of the patient's questions and concerns were addressed. Topical Retinoid counseling:  Patient advised to apply a pea-sized amount only at bedtime and wait 30 minutes after washing their face before applying.  If too drying, patient may add a non-comedogenic moisturizer. The patient verbalized understanding of the proper use and possible adverse effects of retinoids.  All of the patient's questions and concerns were addressed. Aklief Pregnancy And Lactation Text: It is unknown if this medication is safe to use during pregnancy.  It is unknown if this medication is excreted in breast milk.  Breastfeeding women should use the topical cream on the smallest area of the skin for the shortest time needed while breastfeeding.  Do not apply to nipple and areola. Winlevi Pregnancy And Lactation Text: This medication is considered safe during pregnancy and breastfeeding. Isotretinoin Counseling: Patient should get monthly blood tests, not donate blood, not drive at night if vision affected, not share medication, and not undergo elective surgery for 6 months after tx completed. Side effects reviewed, pt to contact office should one occur. Dapsone Pregnancy And Lactation Text: This medication is Pregnancy Category C and is not considered safe during pregnancy or breast feeding. Spironolactone Pregnancy And Lactation Text: This medication can cause feminization of the male fetus and should be avoided during pregnancy. The active metabolite is also found in breast milk. Topical Clindamycin Pregnancy And Lactation Text: This medication is Pregnancy Category B and is considered safe during pregnancy. It is unknown if it is excreted in breast milk. Bactrim Pregnancy And Lactation Text: This medication is Pregnancy Category D and is known to cause fetal risk.  It is also excreted in breast milk. Detail Level: Simple Azelaic Acid Counseling: Patient counseled that medicine may cause skin irritation and to avoid applying near the eyes.  In the event of skin irritation, the patient was advised to reduce the amount of the drug applied or use it less frequently.   The patient verbalized understanding of the proper use and possible adverse effects of azelaic acid.  All of the patient's questions and concerns were addressed. Doxycycline Counseling:  Patient counseled regarding possible photosensitivity and increased risk for sunburn.  Patient instructed to avoid sunlight, if possible.  When exposed to sunlight, patients should wear protective clothing, sunglasses, and sunscreen.  The patient was instructed to call the office immediately if the following severe adverse effects occur:  hearing changes, easy bruising/bleeding, severe headache, or vision changes.  The patient verbalized understanding of the proper use and possible adverse effects of doxycycline.  All of the patient's questions and concerns were addressed. Tetracycline Counseling: Patient counseled regarding possible photosensitivity and increased risk for sunburn.  Patient instructed to avoid sunlight, if possible.  When exposed to sunlight, patients should wear protective clothing, sunglasses, and sunscreen.  The patient was instructed to call the office immediately if the following severe adverse effects occur:  hearing changes, easy bruising/bleeding, severe headache, or vision changes.  The patient verbalized understanding of the proper use and possible adverse effects of tetracycline.  All of the patient's questions and concerns were addressed. Patient understands to avoid pregnancy while on therapy due to potential birth defects. Minocycline Pregnancy And Lactation Text: This medication is Pregnancy Category D and not consider safe during pregnancy. It is also excreted in breast milk. Topical Retinoid Pregnancy And Lactation Text: This medication is Pregnancy Category C. It is unknown if this medication is excreted in breast milk. Birth Control Pills Counseling: Birth Control Pill Counseling: I discussed with the patient the potential side effects of OCPs including but not limited to increased risk of stroke, heart attack, thrombophlebitis, deep venous thrombosis, hepatic adenomas, breast changes, GI upset, headaches, and depression.  The patient verbalized understanding of the proper use and possible adverse effects of OCPs. All of the patient's questions and concerns were addressed. Isotretinoin Pregnancy And Lactation Text: This medication is Pregnancy Category X and is considered extremely dangerous during pregnancy. It is unknown if it is excreted in breast milk. Azelaic Acid Pregnancy And Lactation Text: This medication is considered safe during pregnancy and breast feeding. Topical Sulfur Applications Counseling: Topical Sulfur Counseling: Patient counseled that this medication may cause skin irritation or allergic reactions.  In the event of skin irritation, the patient was advised to reduce the amount of the drug applied or use it less frequently.   The patient verbalized understanding of the proper use and possible adverse effects of topical sulfur application.  All of the patient's questions and concerns were addressed. Tazorac Counseling:  Patient advised that medication is irritating and drying.  Patient may need to apply sparingly and wash off after an hour before eventually leaving it on overnight.  The patient verbalized understanding of the proper use and possible adverse effects of tazorac.  All of the patient's questions and concerns were addressed. Sarecycline Counseling: Patient advised regarding possible photosensitivity and discoloration of the teeth, skin, lips, tongue and gums.  Patient instructed to avoid sunlight, if possible.  When exposed to sunlight, patients should wear protective clothing, sunglasses, and sunscreen.  The patient was instructed to call the office immediately if the following severe adverse effects occur:  hearing changes, easy bruising/bleeding, severe headache, or vision changes.  The patient verbalized understanding of the proper use and possible adverse effects of sarecycline.  All of the patient's questions and concerns were addressed. Azithromycin Counseling:  I discussed with the patient the risks of azithromycin including but not limited to GI upset, allergic reaction, drug rash, diarrhea, and yeast infections. High Dose Vitamin A Counseling: Side effects reviewed, pt to contact office should one occur. Doxycycline Pregnancy And Lactation Text: This medication is Pregnancy Category D and not consider safe during pregnancy. It is also excreted in breast milk but is considered safe for shorter treatment courses. Birth Control Pills Pregnancy And Lactation Text: This medication should be avoided if pregnant and for the first 30 days post-partum. Topical Sulfur Applications Pregnancy And Lactation Text: This medication is Pregnancy Category C and has an unknown safety profile during pregnancy. It is unknown if this topical medication is excreted in breast milk. Use Enhanced Medication Counseling?: No Tazorac Pregnancy And Lactation Text: This medication is not safe during pregnancy. It is unknown if this medication is excreted in breast milk. Benzoyl Peroxide Counseling: Patient counseled that medicine may cause skin irritation and bleach clothing.  In the event of skin irritation, the patient was advised to reduce the amount of the drug applied or use it less frequently.   The patient verbalized understanding of the proper use and possible adverse effects of benzoyl peroxide.  All of the patient's questions and concerns were addressed. Detail Level: Detailed Detail Level: Zone

## 2023-11-06 NOTE — TELEPHONE ENCOUNTER
Patient called and states Myrbetriq 25 mg cost a lot for patient. Her pharmacy told her Oxybutynin is free to her. Can this medication be switched? Please let patient know. [address] [employer] Maintenance   Topic Date Due    COVID-19 Vaccine (1) Never done    Shingles vaccine (1 of 2) Never done    Breast cancer screen  09/04/2021    DTaP/Tdap/Td vaccine (2 - Td or Tdap) 06/10/2023    Flu vaccine (1) Never done    A1C test (Diabetic or Prediabetic)  07/27/2024    Lipids  07/27/2024    Depression Screen  08/24/2024    Low dose CT lung screening &/or counseling  09/27/2024    Colorectal Cancer Screen  09/08/2028    DEXA (modify frequency per FRAX score)  Completed    Pneumococcal 65+ years Vaccine  Completed    Hepatitis A vaccine  Aged Out    Hepatitis B vaccine  Aged Out    Hib vaccine  Aged Out    Meningococcal (ACWY) vaccine  Aged Out    Diabetes screen  Discontinued    Hepatitis C screen  Discontinued             (applicable per patient's age: Cancer Screenings, Depression Screening, Fall Risk Screening, Immunizations)    Hemoglobin A1C (%)   Date Value   07/27/2023 5.9     LDL Cholesterol (mg/dL)   Date Value   07/27/2023 79     LDL Calculated (mg/dL)   Date Value   05/03/2018 88     AST (U/L)   Date Value   07/27/2023 13     ALT (U/L)   Date Value   07/27/2023 12     BUN (mg/dL)   Date Value   07/27/2023 11      (goal A1C is < 7)   (goal LDL is <100) need 30-50% reduction from baseline     BP Readings from Last 3 Encounters:   08/24/23 136/70   07/27/23 130/60   12/16/21 130/78    (goal /80)      All Future Testing planned in [facility]:  Lab Frequency Next Occurrence       Next Visit Date:  Future Appointments   Date Time Provider [address]   2/26/2024  8:00 AM DO STEFANIE De La Cruz [facility]            Patient Active Problem List:     Polyp of colon     Obesity (BMI 30-39. 9)     Elevated blood pressure reading without diagnosis of hypertension     Mixed hyperlipidemia

## 2023-11-27 DIAGNOSIS — N39.492 POSTURAL URINARY INCONTINENCE: ICD-10-CM

## 2023-11-27 RX ORDER — OXYBUTYNIN CHLORIDE 10 MG/1
10 TABLET, EXTENDED RELEASE ORAL DAILY
Qty: 90 TABLET | Refills: 1 | Status: SHIPPED | OUTPATIENT
Start: 2023-11-27

## 2023-11-27 NOTE — TELEPHONE ENCOUNTER
Last OV: 8/24/2023  Next scheduled apt: 2/26/2024      Pt requesting med refill Ditropan XL , med pending approval.      Thank you

## 2024-02-21 DIAGNOSIS — E78.2 MIXED HYPERLIPIDEMIA: ICD-10-CM

## 2024-02-21 DIAGNOSIS — M54.50 CHRONIC RIGHT-SIDED LOW BACK PAIN WITHOUT SCIATICA: ICD-10-CM

## 2024-02-21 DIAGNOSIS — N39.492 POSTURAL URINARY INCONTINENCE: ICD-10-CM

## 2024-02-21 DIAGNOSIS — G89.29 CHRONIC RIGHT-SIDED LOW BACK PAIN WITHOUT SCIATICA: ICD-10-CM

## 2024-02-21 NOTE — TELEPHONE ENCOUNTER
Last OV: 8/24/2023    Next scheduled apt: 2/26/2024      Pharmacy requesting refill  Medications pending

## 2024-02-22 RX ORDER — OXYBUTYNIN CHLORIDE 10 MG/1
10 TABLET, EXTENDED RELEASE ORAL DAILY
Qty: 90 TABLET | Refills: 1 | Status: SHIPPED | OUTPATIENT
Start: 2024-02-22

## 2024-02-22 RX ORDER — ATORVASTATIN CALCIUM 10 MG/1
10 TABLET, FILM COATED ORAL DAILY
Qty: 90 TABLET | Refills: 1 | Status: SHIPPED | OUTPATIENT
Start: 2024-02-22

## 2024-02-22 RX ORDER — LEVOTHYROXINE SODIUM 0.05 MG/1
50 TABLET ORAL DAILY
Qty: 90 TABLET | Refills: 1 | Status: SHIPPED | OUTPATIENT
Start: 2024-02-22

## 2024-02-26 ENCOUNTER — OFFICE VISIT (OUTPATIENT)
Dept: FAMILY MEDICINE CLINIC | Age: 74
End: 2024-02-26
Payer: MEDICARE

## 2024-02-26 ENCOUNTER — HOSPITAL ENCOUNTER (OUTPATIENT)
Age: 74
Discharge: HOME | End: 2024-02-26
Payer: MEDICARE

## 2024-02-26 VITALS
BODY MASS INDEX: 41.97 KG/M2 | WEIGHT: 268 LBS | SYSTOLIC BLOOD PRESSURE: 136 MMHG | HEART RATE: 70 BPM | OXYGEN SATURATION: 96 % | DIASTOLIC BLOOD PRESSURE: 70 MMHG

## 2024-02-26 DIAGNOSIS — E03.9 ACQUIRED HYPOTHYROIDISM: ICD-10-CM

## 2024-02-26 DIAGNOSIS — Z13.1 SCREENING FOR DIABETES MELLITUS: ICD-10-CM

## 2024-02-26 DIAGNOSIS — E78.2 MIXED HYPERLIPIDEMIA: ICD-10-CM

## 2024-02-26 DIAGNOSIS — E66.01 CLASS 3 OBESITY (HCC): Primary | ICD-10-CM

## 2024-02-26 DIAGNOSIS — E66.01 CLASS 3 OBESITY (HCC): ICD-10-CM

## 2024-02-26 LAB
ALBUMIN SERPL-MCNC: 3.8 G/DL (ref 3.5–5.2)
ALP SERPL-CCNC: 89 U/L (ref 35–104)
ALT SERPL-CCNC: 21 U/L (ref 5–33)
ANION GAP SERPL CALCULATED.3IONS-SCNC: 7 MMOL/L (ref 9–17)
AST SERPL-CCNC: 14 U/L
BASOPHILS # BLD: 0.01 K/UL (ref 0–0.2)
BASOPHILS NFR BLD: 0 % (ref 0–2)
BILIRUB SERPL-MCNC: 0.4 MG/DL (ref 0.3–1.2)
BUN SERPL-MCNC: 15 MG/DL (ref 8–23)
BUN/CREAT SERPL: 19 (ref 9–20)
CALCIUM SERPL-MCNC: 8.8 MG/DL (ref 8.6–10.4)
CHLORIDE SERPL-SCNC: 103 MMOL/L (ref 98–107)
CHOLEST SERPL-MCNC: 138 MG/DL (ref 0–199)
CHOLESTEROL/HDL RATIO: 3
CO2 SERPL-SCNC: 29 MMOL/L (ref 20–31)
CREAT SERPL-MCNC: 0.8 MG/DL (ref 0.5–0.9)
EOSINOPHIL # BLD: 0.28 K/UL (ref 0–0.4)
EOSINOPHILS RELATIVE PERCENT: 3 % (ref 0–5)
ERYTHROCYTE [DISTWIDTH] IN BLOOD BY AUTOMATED COUNT: 13.2 % (ref 12.1–15.2)
EST. AVERAGE GLUCOSE BLD GHB EST-MCNC: 114 MG/DL
GFR SERPL CREATININE-BSD FRML MDRD: >60 ML/MIN/1.73M2
GLUCOSE SERPL-MCNC: 100 MG/DL (ref 70–99)
HBA1C MFR BLD: 5.6 % (ref 4–6)
HCT VFR BLD AUTO: 46.1 % (ref 36–46)
HDLC SERPL-MCNC: 40 MG/DL
HGB BLD-MCNC: 14.7 G/DL (ref 12–16)
IMM GRANULOCYTES # BLD AUTO: 0 K/UL (ref 0–0.3)
IMM GRANULOCYTES NFR BLD: 0 % (ref 0–5)
LDLC SERPL CALC-MCNC: 75 MG/DL (ref 0–100)
LYMPHOCYTES NFR BLD: 1.92 K/UL (ref 1–4.8)
LYMPHOCYTES RELATIVE PERCENT: 23 % (ref 15–40)
MCH RBC QN AUTO: 30 PG (ref 26–34)
MCHC RBC AUTO-ENTMCNC: 31.9 G/DL (ref 31–37)
MCV RBC AUTO: 94.1 FL (ref 80–100)
MONOCYTES NFR BLD: 0.52 K/UL (ref 0–1)
MONOCYTES NFR BLD: 6 % (ref 4–8)
NEUTROPHILS NFR BLD: 68 % (ref 47–75)
NEUTS SEG NFR BLD: 5.49 K/UL (ref 2.5–7)
PLATELET # BLD AUTO: 305 K/UL (ref 140–450)
PMV BLD AUTO: 9.8 FL (ref 6–12)
POTASSIUM SERPL-SCNC: 4.4 MMOL/L (ref 3.7–5.3)
PROT SERPL-MCNC: 7.1 G/DL (ref 6.4–8.3)
RBC # BLD AUTO: 4.9 M/UL (ref 4–5.2)
SODIUM SERPL-SCNC: 139 MMOL/L (ref 135–144)
T4 FREE SERPL-MCNC: 1 NG/DL (ref 0.9–1.7)
TRIGL SERPL-MCNC: 115 MG/DL
TSH SERPL DL<=0.05 MIU/L-ACNC: 6.68 UIU/ML (ref 0.3–5)
VLDLC SERPL CALC-MCNC: 23 MG/DL
WBC OTHER # BLD: 8.2 K/UL (ref 3.5–11)

## 2024-02-26 PROCEDURE — 84439 ASSAY OF FREE THYROXINE: CPT

## 2024-02-26 PROCEDURE — 83036 HEMOGLOBIN GLYCOSYLATED A1C: CPT

## 2024-02-26 PROCEDURE — 80053 COMPREHEN METABOLIC PANEL: CPT

## 2024-02-26 PROCEDURE — 85025 COMPLETE CBC W/AUTO DIFF WBC: CPT

## 2024-02-26 PROCEDURE — 80061 LIPID PANEL: CPT

## 2024-02-26 PROCEDURE — 99213 OFFICE O/P EST LOW 20 MIN: CPT | Performed by: STUDENT IN AN ORGANIZED HEALTH CARE EDUCATION/TRAINING PROGRAM

## 2024-02-26 PROCEDURE — 1123F ACP DISCUSS/DSCN MKR DOCD: CPT | Performed by: STUDENT IN AN ORGANIZED HEALTH CARE EDUCATION/TRAINING PROGRAM

## 2024-02-26 PROCEDURE — 36415 COLL VENOUS BLD VENIPUNCTURE: CPT

## 2024-02-26 PROCEDURE — 84443 ASSAY THYROID STIM HORMONE: CPT

## 2024-02-26 ASSESSMENT — PATIENT HEALTH QUESTIONNAIRE - PHQ9
SUM OF ALL RESPONSES TO PHQ QUESTIONS 1-9: 0
2. FEELING DOWN, DEPRESSED OR HOPELESS: 0
1. LITTLE INTEREST OR PLEASURE IN DOING THINGS: 0
SUM OF ALL RESPONSES TO PHQ QUESTIONS 1-9: 0
SUM OF ALL RESPONSES TO PHQ9 QUESTIONS 1 & 2: 0

## 2024-02-26 ASSESSMENT — ENCOUNTER SYMPTOMS
NAUSEA: 0
RHINORRHEA: 0
VOMITING: 0
WHEEZING: 0
BACK PAIN: 0
SINUS PAIN: 0
COUGH: 0
ABDOMINAL PAIN: 0
DIARRHEA: 0

## 2024-02-26 NOTE — PATIENT INSTRUCTIONS
SURVEY:    You may be receiving a survey from [employer] regarding your visit today.    You may get this in the mail, through your MyChart or in your email.     Please complete the survey to enable us to provide the highest quality of care to you and your family.    If you cannot score us as very good ( 5 Stars) on any question, please feel free to call the office to discuss how we could have made your experience exceptional.     Thank you.    Clinical Care Team:  Dr. Chandler Michele, DO Symone Gabriel LPN    Triage:  Nicole Ojeda CMA    Clerical Team:  Nicole Llamas

## 2024-02-26 NOTE — PROGRESS NOTES
HPI Notes    Name: Yeimi Montez  : 1950         Chief Complaint:     Chief Complaint   Patient presents with    Hypothyroidism     6 month follow up    Weight Management     Patient would like to discuss options to manage weight       History of Present Illness:        HPI    This is a 73-year-old woman with hypothyroidism, nicotine dependence, obesity, hyperlipidemia, presenting principally to discuss weight management options.     Past Medical History:     Past Medical History:   Diagnosis Date    Hyperlipidemia     Thyroid disease     Venous ulcer (HCC)     history      Reviewed all health maintenance requirements and ordered appropriate tests  Health Maintenance Due   Topic Date Due    COVID-19 Vaccine (1) Never done    Shingles vaccine (1 of 2) Never done    Respiratory Syncytial Virus (RSV) Pregnant or age 60 yrs+ (1 - 1-dose 60+ series) Never done    Breast cancer screen  2021    DTaP/Tdap/Td vaccine (2 - Td or Tdap) 06/10/2023    Flu vaccine (1) Never done    Annual Wellness Visit (Medicare Advantage)  2024       Past Surgical History:     Past Surgical History:   Procedure Laterality Date    APPENDECTOMY  2018    ruptured appendix    BLADDER SUSPENSION      COLONOSCOPY      HEMORRHOID SURGERY      HYSTERECTOMY (CERVIX STATUS UNKNOWN)      TUBAL LIGATION      VEIN SURGERY      Lt leg \"vein removed\"        Medications:       Prior to Admission medications    Medication Sig Start Date End Date Taking? Authorizing Provider   oxyBUTYnin (DITROPAN XL) 10 MG extended release tablet Take 1 tablet by mouth daily 24  Yes Chandler Michele DO   diclofenac (VOLTAREN) 50 MG EC tablet Take 1 tablet by mouth 2 times daily 24  Yes Chandler Michele DO   atorvastatin (LIPITOR) 10 MG tablet Take 1 tablet by mouth daily 24  Yes Chandler Michele DO   levothyroxine (SYNTHROID) 50 MCG tablet Take 1 tablet by mouth daily 24  Yes Chandler Michele DO        Allergies:

## 2024-02-27 DIAGNOSIS — E03.9 ACQUIRED HYPOTHYROIDISM: Primary | ICD-10-CM

## 2024-02-29 ENCOUNTER — TELEPHONE (OUTPATIENT)
Dept: FAMILY MEDICINE CLINIC | Age: 74
End: 2024-02-29

## 2024-02-29 NOTE — TELEPHONE ENCOUNTER
Patient is calling in to let Dr. Michele know she would like to start the ozempic.  Patient states Dr. Michele said a place in [place] where she can get it cheaper.  Patient last seen in office 2/26/24.  Any questions please call patient.    Health Maintenance   Topic Date Due    COVID-19 Vaccine (1) Never done    Shingles vaccine (1 of 2) Never done    Respiratory Syncytial Virus (RSV) Pregnant or age 60 yrs+ (1 - 1-dose 60+ series) Never done    Breast cancer screen  09/04/2021    DTaP/Tdap/Td vaccine (2 - Td or Tdap) 06/10/2023    Flu vaccine (1) Never done    Annual Wellness Visit (Medicare Advantage)  01/01/2024    Low dose CT lung screening &/or counseling  09/27/2024    Lipids  02/26/2025    Depression Screen  02/26/2025    Colorectal Cancer Screen  09/08/2028    DEXA (modify frequency per FRAX score)  Completed    Pneumococcal 65+ years Vaccine  Completed    Hepatitis A vaccine  Aged Out    Hepatitis B vaccine  Aged Out    Hib vaccine  Aged Out    Polio vaccine  Aged Out    Meningococcal (ACWY) vaccine  Aged Out    A1C test (Diabetic or Prediabetic)  Discontinued    Diabetes screen  Discontinued    Hepatitis C screen  Discontinued             (applicable per patient's age: Cancer Screenings, Depression Screening, Fall Risk Screening, Immunizations)    Hemoglobin A1C (%)   Date Value   02/26/2024 5.6   07/27/2023 5.9     LDL Cholesterol (mg/dL)   Date Value   02/26/2024 75     LDL Calculated (mg/dL)   Date Value   05/03/2018 88     AST (U/L)   Date Value   02/26/2024 14     ALT (U/L)   Date Value   02/26/2024 21     BUN (mg/dL)   Date Value   02/26/2024 15      (goal A1C is < 7)   (goal LDL is <100) need 30-50% reduction from baseline     BP Readings from Last 3 Encounters:   02/26/24 136/70   08/24/23 136/70   07/27/23 130/60    (goal /80)      All Future Testing planned in [facility]:  Lab Frequency Next Occurrence   TSH With Reflex Ft4 Once 02/27/2024       Next Visit Date:  Future Appointments

## 2024-03-11 ENCOUNTER — HOSPITAL ENCOUNTER (OUTPATIENT)
Age: 74
Discharge: HOME | End: 2024-03-11
Payer: MEDICARE

## 2024-03-11 DIAGNOSIS — E03.9 ACQUIRED HYPOTHYROIDISM: Primary | ICD-10-CM

## 2024-03-11 DIAGNOSIS — E03.9 ACQUIRED HYPOTHYROIDISM: ICD-10-CM

## 2024-03-11 LAB
T4 FREE SERPL-MCNC: 1 NG/DL (ref 0.93–1.7)
TSH SERPL DL<=0.05 MIU/L-ACNC: 7.64 UIU/ML (ref 0.3–5)

## 2024-03-11 PROCEDURE — 84443 ASSAY THYROID STIM HORMONE: CPT

## 2024-03-11 PROCEDURE — 84439 ASSAY OF FREE THYROXINE: CPT

## 2024-03-11 PROCEDURE — 36415 COLL VENOUS BLD VENIPUNCTURE: CPT

## 2024-05-21 NOTE — TELEPHONE ENCOUNTER
Ozempic    [facility]    Last check up 2/26/24    Health Maintenance   Topic Date Due    COVID-19 Vaccine (1) Never done    Shingles vaccine (1 of 2) Never done    Respiratory Syncytial Virus (RSV) Pregnant or age 60 yrs+ (1 - 1-dose 60+ series) Never done    Breast cancer screen  09/04/2021    DTaP/Tdap/Td vaccine (2 - Td or Tdap) 06/10/2023    Annual Wellness Visit (Medicare Advantage)  01/01/2024    Flu vaccine (Season Ended) 08/01/2024    Low dose CT lung screening &/or counseling  09/27/2024    Lipids  02/26/2025    Depression Screen  02/26/2025    Colorectal Cancer Screen  09/08/2028    DEXA (modify frequency per FRAX score)  Completed    Pneumococcal 65+ years Vaccine  Completed    Hepatitis A vaccine  Aged Out    Hepatitis B vaccine  Aged Out    Hib vaccine  Aged Out    Polio vaccine  Aged Out    Meningococcal (ACWY) vaccine  Aged Out    A1C test (Diabetic or Prediabetic)  Discontinued    Diabetes screen  Discontinued    Hepatitis C screen  Discontinued             (applicable per patient's age: Cancer Screenings, Depression Screening, Fall Risk Screening, Immunizations)    Hemoglobin A1C (%)   Date Value   02/26/2024 5.6   07/27/2023 5.9     AST (U/L)   Date Value   02/26/2024 14     ALT (U/L)   Date Value   02/26/2024 21     BUN (mg/dL)   Date Value   02/26/2024 15      (goal A1C is < 7)   (goal LDL is <100) need 30-50% reduction from baseline     BP Readings from Last 3 Encounters:   02/26/24 136/70   08/24/23 136/70   07/27/23 130/60    (goal /80)      All Future Testing planned in [facility]:  Lab Frequency Next Occurrence   TSH With Reflex Ft4 Once 04/22/2024       Next Visit Date:  Future Appointments   Date Time Provider Department Center   8/26/2024  9:00 AM Chandler Michele DO [facility]            Patient Active Problem List:     Polyp of colon     Class 3 obesity (HCC)     Elevated blood pressure reading without diagnosis of hypertension     Mixed hyperlipidemia     Acquired

## 2024-06-25 NOTE — TELEPHONE ENCOUNTER
hypertension     Mixed hyperlipidemia     Acquired hypothyroidism     Morbidly obese (HCC)     Cigarette nicotine dependence without complication     History of arthroplasty of right shoulder

## 2024-08-07 ENCOUNTER — HOSPITAL ENCOUNTER (OUTPATIENT)
Age: 74
Discharge: HOME | End: 2024-08-07
Payer: MEDICARE

## 2024-08-07 ENCOUNTER — OFFICE VISIT (OUTPATIENT)
Dept: FAMILY MEDICINE CLINIC | Age: 74
End: 2024-08-07
Payer: MEDICARE

## 2024-08-07 VITALS
BODY MASS INDEX: 38.06 KG/M2 | SYSTOLIC BLOOD PRESSURE: 118 MMHG | WEIGHT: 243 LBS | HEART RATE: 41 BPM | DIASTOLIC BLOOD PRESSURE: 60 MMHG

## 2024-08-07 DIAGNOSIS — E03.9 ACQUIRED HYPOTHYROIDISM: ICD-10-CM

## 2024-08-07 DIAGNOSIS — R00.1 BRADYCARDIA: Primary | ICD-10-CM

## 2024-08-07 DIAGNOSIS — R00.1 BRADYCARDIA: ICD-10-CM

## 2024-08-07 LAB — TSH SERPL DL<=0.05 MIU/L-ACNC: 3.61 UIU/ML (ref 0.3–5)

## 2024-08-07 PROCEDURE — 99213 OFFICE O/P EST LOW 20 MIN: CPT | Performed by: STUDENT IN AN ORGANIZED HEALTH CARE EDUCATION/TRAINING PROGRAM

## 2024-08-07 PROCEDURE — 84443 ASSAY THYROID STIM HORMONE: CPT

## 2024-08-07 PROCEDURE — 1123F ACP DISCUSS/DSCN MKR DOCD: CPT | Performed by: STUDENT IN AN ORGANIZED HEALTH CARE EDUCATION/TRAINING PROGRAM

## 2024-08-07 PROCEDURE — 36415 COLL VENOUS BLD VENIPUNCTURE: CPT

## 2024-08-07 PROCEDURE — 93005 ELECTROCARDIOGRAM TRACING: CPT

## 2024-08-07 SDOH — ECONOMIC STABILITY: FOOD INSECURITY: WITHIN THE PAST 12 MONTHS, THE FOOD YOU BOUGHT JUST DIDN'T LAST AND YOU DIDN'T HAVE MONEY TO GET MORE.: NEVER TRUE

## 2024-08-07 SDOH — ECONOMIC STABILITY: FOOD INSECURITY: WITHIN THE PAST 12 MONTHS, YOU WORRIED THAT YOUR FOOD WOULD RUN OUT BEFORE YOU GOT MONEY TO BUY MORE.: NEVER TRUE

## 2024-08-07 SDOH — ECONOMIC STABILITY: INCOME INSECURITY: HOW HARD IS IT FOR YOU TO PAY FOR THE VERY BASICS LIKE FOOD, HOUSING, MEDICAL CARE, AND HEATING?: NOT HARD AT ALL

## 2024-08-07 ASSESSMENT — ENCOUNTER SYMPTOMS
RHINORRHEA: 0
SINUS PAIN: 0
VOMITING: 0
ABDOMINAL PAIN: 0
COUGH: 0
NAUSEA: 0
DIARRHEA: 0
WHEEZING: 0
BACK PAIN: 0

## 2024-08-07 NOTE — PATIENT INSTRUCTIONS
SURVEY:    You may be receiving a survey from [facility] regarding your visit today.    You may get this in the mail, through your MyChart or in your email.     Please complete the survey to enable us to provide the highest quality of care to you and your family.    If you cannot score us as very good ( 5 Stars) on any question, please feel free to call the office to discuss how we could have made your experience exceptional.     Thank you.    Clinical Care Team:  Dr. Chandler Michele, DO Symone Gabriel LPN    Triage:  Nicole Ojeda CMA    Clerical Team:  Nicole Llamas

## 2024-08-07 NOTE — PROGRESS NOTES
HPI Notes    Name: Yeimi Montez  : 1950         Chief Complaint:     Chief Complaint   Patient presents with    Bradycardia     Patients pulse has been running in the 40's. She is experiencing dizziness and dull headaches as well.       History of Present Illness:      HPI    This is a 73-year-old woman presenting for evaluation of bradycardia.  This was first noted during an alert from her smart watch a few weeks ago. While bradycardic, she is experiencing dizziness as well as dull headaches.  Also of note, she has hypothyroidism with abnormal TSH that has not been rechecked.  Orders pending and has been for several months. She has been taking her levothyroxine first thing in the morning without any food/drink/other medications.     Past Medical History:     Past Medical History:   Diagnosis Date    Hyperlipidemia     Thyroid disease     Venous ulcer (HCC)     history      Reviewed all health maintenance requirements and ordered appropriate tests  Health Maintenance Due   Topic Date Due    COVID-19 Vaccine (1) Never done    Shingles vaccine (1 of 2) Never done    Respiratory Syncytial Virus (RSV) Pregnant or age 60 yrs+ (1 - 1-dose 60+ series) Never done    Breast cancer screen  2021    DTaP/Tdap/Td vaccine (2 - Td or Tdap) 06/10/2023    Annual Wellness Visit (Medicare Advantage)  2024    Flu vaccine (1) Never done       Past Surgical History:     Past Surgical History:   Procedure Laterality Date    APPENDECTOMY  2018    ruptured appendix    BLADDER SUSPENSION      COLONOSCOPY      HEMORRHOID SURGERY      HYSTERECTOMY (CERVIX STATUS UNKNOWN)      TUBAL LIGATION      VEIN SURGERY      Lt leg \"vein removed\"        Medications:       Prior to Admission medications    Medication Sig Start Date End Date Taking? Authorizing Provider   oxyBUTYnin (DITROPAN XL) 10 MG extended release tablet Take 1 tablet by mouth daily 24  Yes Chandler Michele DO   diclofenac (VOLTAREN) 50 MG EC tablet

## 2024-08-08 ENCOUNTER — TELEPHONE (OUTPATIENT)
Dept: FAMILY MEDICINE CLINIC | Age: 74
End: 2024-08-08

## 2024-08-08 DIAGNOSIS — R00.1 BRADYCARDIA: Primary | ICD-10-CM

## 2024-08-08 LAB
EKG ATRIAL RATE: 66 BPM
EKG P AXIS: 63 DEGREES
EKG P-R INTERVAL: 180 MS
EKG Q-T INTERVAL: 414 MS
EKG QRS DURATION: 78 MS
EKG QTC CALCULATION (BAZETT): 434 MS
EKG R AXIS: 28 DEGREES
EKG T AXIS: 68 DEGREES
EKG VENTRICULAR RATE: 66 BPM

## 2024-08-08 PROCEDURE — 93010 ELECTROCARDIOGRAM REPORT: CPT | Performed by: INTERNAL MEDICINE

## 2024-08-08 NOTE — TELEPHONE ENCOUNTER
TSH and EKG look healthy. No bradycardia on EKG. I would recommend getting a troponin to rule out a cardiac event and doing a 48 hr Holter study. If abnormal, we may need to consult Dr. Moss for considering a pacemaker.

## 2024-08-09 ENCOUNTER — HOSPITAL ENCOUNTER (OUTPATIENT)
Age: 74
End: 2024-08-09
Attending: STUDENT IN AN ORGANIZED HEALTH CARE EDUCATION/TRAINING PROGRAM
Payer: MEDICARE

## 2024-08-09 ENCOUNTER — HOSPITAL ENCOUNTER (OUTPATIENT)
Age: 74
Discharge: HOME | End: 2024-08-09
Attending: STUDENT IN AN ORGANIZED HEALTH CARE EDUCATION/TRAINING PROGRAM
Payer: MEDICARE

## 2024-08-09 DIAGNOSIS — R00.1 BRADYCARDIA: ICD-10-CM

## 2024-08-09 LAB — TROPONIN I SERPL HS-MCNC: 33 NG/L (ref 0–14)

## 2024-08-09 PROCEDURE — 84484 ASSAY OF TROPONIN QUANT: CPT

## 2024-08-09 PROCEDURE — 93225 XTRNL ECG REC<48 HRS REC: CPT

## 2024-08-09 PROCEDURE — 36415 COLL VENOUS BLD VENIPUNCTURE: CPT

## 2024-08-12 DIAGNOSIS — R79.89 ELEVATED TROPONIN: ICD-10-CM

## 2024-08-12 DIAGNOSIS — R00.1 BRADYCARDIA: Primary | ICD-10-CM

## 2024-08-13 ENCOUNTER — HOSPITAL ENCOUNTER (OUTPATIENT)
Age: 74
Discharge: HOME | End: 2024-08-13
Payer: MEDICARE

## 2024-08-13 DIAGNOSIS — R00.1 BRADYCARDIA: ICD-10-CM

## 2024-08-13 DIAGNOSIS — R79.89 ELEVATED TROPONIN: ICD-10-CM

## 2024-08-13 LAB — TROPONIN I SERPL HS-MCNC: 31 NG/L (ref 0–14)

## 2024-08-13 PROCEDURE — 36415 COLL VENOUS BLD VENIPUNCTURE: CPT

## 2024-08-13 PROCEDURE — 84484 ASSAY OF TROPONIN QUANT: CPT

## 2024-08-14 LAB
ACQUISITION DURATION: NORMAL S
AVERAGE HEART RATE: 79 BPM
EKG DIAGNOSIS: NORMAL
HOLTER MAX HEART RATE: 102 BPM
HOOKUP DATE: NORMAL
HOOKUP TIME: NORMAL
MAX HEART RATE TIME/DATE: NORMAL
MIN HEART RATE TIME/DATE: NORMAL
MIN HEART RATE: 63 BPM
NUMBER OF QRS COMPLEXES: NORMAL
NUMBER OF SUPRAVENTRICULAR COUPLETS: 0
NUMBER OF SUPRAVENTRICULAR ECTOPICS: NORMAL
NUMBER OF SUPRAVENTRICULAR ISOLATED BEATS: NORMAL
NUMBER OF VENTRICULAR BIGEMINAL CYCLES: 0
NUMBER OF VENTRICULAR COUPLETS: 0
NUMBER OF VENTRICULAR ECTOPICS: 2

## 2024-08-14 PROCEDURE — 93227 XTRNL ECG REC<48 HR R&I: CPT | Performed by: INTERNAL MEDICINE

## 2024-08-15 DIAGNOSIS — E78.2 MIXED HYPERLIPIDEMIA: ICD-10-CM

## 2024-08-15 DIAGNOSIS — E55.9 VITAMIN D DEFICIENCY: ICD-10-CM

## 2024-08-15 DIAGNOSIS — R03.0 ELEVATED BLOOD PRESSURE READING WITHOUT DIAGNOSIS OF HYPERTENSION: Primary | ICD-10-CM

## 2024-08-15 DIAGNOSIS — E03.9 ACQUIRED HYPOTHYROIDISM: ICD-10-CM

## 2024-08-26 ENCOUNTER — OFFICE VISIT (OUTPATIENT)
Dept: FAMILY MEDICINE CLINIC | Age: 74
End: 2024-08-26
Payer: MEDICARE

## 2024-08-26 ENCOUNTER — HOSPITAL ENCOUNTER (OUTPATIENT)
Age: 74
Discharge: HOME | End: 2024-08-26
Payer: MEDICARE

## 2024-08-26 ENCOUNTER — HOSPITAL ENCOUNTER (OUTPATIENT)
Age: 74
Discharge: HOME | End: 2024-08-28
Payer: MEDICARE

## 2024-08-26 ENCOUNTER — HOSPITAL ENCOUNTER (OUTPATIENT)
Dept: GENERAL RADIOLOGY | Age: 74
Discharge: HOME | End: 2024-08-28
Payer: MEDICARE

## 2024-08-26 VITALS
OXYGEN SATURATION: 94 % | HEIGHT: 67 IN | HEART RATE: 72 BPM | BODY MASS INDEX: 37.67 KG/M2 | DIASTOLIC BLOOD PRESSURE: 70 MMHG | WEIGHT: 240 LBS | SYSTOLIC BLOOD PRESSURE: 132 MMHG

## 2024-08-26 DIAGNOSIS — R03.0 ELEVATED BLOOD PRESSURE READING WITHOUT DIAGNOSIS OF HYPERTENSION: ICD-10-CM

## 2024-08-26 DIAGNOSIS — E55.9 VITAMIN D DEFICIENCY: ICD-10-CM

## 2024-08-26 DIAGNOSIS — E78.2 MIXED HYPERLIPIDEMIA: ICD-10-CM

## 2024-08-26 DIAGNOSIS — E03.9 ACQUIRED HYPOTHYROIDISM: ICD-10-CM

## 2024-08-26 DIAGNOSIS — G89.29 CHRONIC RIGHT-SIDED LOW BACK PAIN WITHOUT SCIATICA: ICD-10-CM

## 2024-08-26 DIAGNOSIS — M54.50 CHRONIC RIGHT-SIDED LOW BACK PAIN WITHOUT SCIATICA: ICD-10-CM

## 2024-08-26 DIAGNOSIS — R00.1 BRADYCARDIA: Primary | ICD-10-CM

## 2024-08-26 LAB
25(OH)D3 SERPL-MCNC: 28.2 NG/ML (ref 30–100)
ALBUMIN SERPL-MCNC: 3.9 G/DL (ref 3.5–5.2)
ALP SERPL-CCNC: 82 U/L (ref 35–104)
ALT SERPL-CCNC: 23 U/L (ref 5–33)
ANION GAP SERPL CALCULATED.3IONS-SCNC: 9 MMOL/L (ref 9–17)
AST SERPL-CCNC: 14 U/L
BASOPHILS # BLD: 0.02 K/UL (ref 0–0.2)
BASOPHILS NFR BLD: 0 % (ref 0–2)
BILIRUB SERPL-MCNC: 0.4 MG/DL (ref 0.3–1.2)
BUN SERPL-MCNC: 20 MG/DL (ref 8–23)
BUN/CREAT SERPL: 25 (ref 9–20)
CALCIUM SERPL-MCNC: 9.4 MG/DL (ref 8.6–10.4)
CHLORIDE SERPL-SCNC: 103 MMOL/L (ref 98–107)
CHOLEST SERPL-MCNC: 135 MG/DL (ref 0–199)
CHOLESTEROL/HDL RATIO: 4
CO2 SERPL-SCNC: 26 MMOL/L (ref 20–31)
CREAT SERPL-MCNC: 0.8 MG/DL (ref 0.5–0.9)
EOSINOPHIL # BLD: 0.32 K/UL (ref 0–0.4)
EOSINOPHILS RELATIVE PERCENT: 4 % (ref 0–5)
ERYTHROCYTE [DISTWIDTH] IN BLOOD BY AUTOMATED COUNT: 13.6 % (ref 12.1–15.2)
GFR, ESTIMATED: 78 ML/MIN/1.73M2
GLUCOSE SERPL-MCNC: 107 MG/DL (ref 70–99)
HCT VFR BLD AUTO: 41.1 % (ref 36–46)
HDLC SERPL-MCNC: 37 MG/DL
HGB BLD-MCNC: 13.5 G/DL (ref 12–16)
IMM GRANULOCYTES # BLD AUTO: 0.01 K/UL (ref 0–0.3)
IMM GRANULOCYTES NFR BLD: 0 % (ref 0–5)
LDLC SERPL CALC-MCNC: 81 MG/DL (ref 0–100)
LYMPHOCYTES NFR BLD: 1.54 K/UL (ref 1–4.8)
LYMPHOCYTES RELATIVE PERCENT: 19 % (ref 15–40)
MAGNESIUM SERPL-MCNC: 2.1 MG/DL (ref 1.6–2.6)
MCH RBC QN AUTO: 30.3 PG (ref 26–34)
MCHC RBC AUTO-ENTMCNC: 32.8 G/DL (ref 31–37)
MCV RBC AUTO: 92.4 FL (ref 80–100)
MONOCYTES NFR BLD: 0.53 K/UL (ref 0–1)
MONOCYTES NFR BLD: 7 % (ref 4–8)
NEUTROPHILS NFR BLD: 70 % (ref 47–75)
NEUTS SEG NFR BLD: 5.68 K/UL (ref 2.5–7)
PLATELET # BLD AUTO: 322 K/UL (ref 140–450)
PMV BLD AUTO: 9.5 FL (ref 6–12)
POTASSIUM SERPL-SCNC: 4.5 MMOL/L (ref 3.7–5.3)
PROT SERPL-MCNC: 6.9 G/DL (ref 6.4–8.3)
RBC # BLD AUTO: 4.45 M/UL (ref 4–5.2)
SODIUM SERPL-SCNC: 138 MMOL/L (ref 135–144)
TRIGL SERPL-MCNC: 83 MG/DL
VLDLC SERPL CALC-MCNC: 17 MG/DL
WBC OTHER # BLD: 8.1 K/UL (ref 3.5–11)

## 2024-08-26 PROCEDURE — 99214 OFFICE O/P EST MOD 30 MIN: CPT | Performed by: STUDENT IN AN ORGANIZED HEALTH CARE EDUCATION/TRAINING PROGRAM

## 2024-08-26 PROCEDURE — 80053 COMPREHEN METABOLIC PANEL: CPT

## 2024-08-26 PROCEDURE — 83735 ASSAY OF MAGNESIUM: CPT

## 2024-08-26 PROCEDURE — 36415 COLL VENOUS BLD VENIPUNCTURE: CPT

## 2024-08-26 PROCEDURE — 82306 VITAMIN D 25 HYDROXY: CPT

## 2024-08-26 PROCEDURE — 80061 LIPID PANEL: CPT

## 2024-08-26 PROCEDURE — 1123F ACP DISCUSS/DSCN MKR DOCD: CPT | Performed by: STUDENT IN AN ORGANIZED HEALTH CARE EDUCATION/TRAINING PROGRAM

## 2024-08-26 PROCEDURE — 85025 COMPLETE CBC W/AUTO DIFF WBC: CPT

## 2024-08-26 PROCEDURE — 71046 X-RAY EXAM CHEST 2 VIEWS: CPT

## 2024-08-26 RX ORDER — LIDOCAINE 50 MG/G
1 PATCH TOPICAL DAILY
Qty: 30 PATCH | Refills: 2 | Status: SHIPPED | OUTPATIENT
Start: 2024-08-26 | End: 2024-11-24

## 2024-08-26 ASSESSMENT — ENCOUNTER SYMPTOMS
VOMITING: 0
DIARRHEA: 0
WHEEZING: 0
ABDOMINAL PAIN: 0
NAUSEA: 0
SINUS PAIN: 0
COUGH: 0
BACK PAIN: 1
RHINORRHEA: 0

## 2024-08-26 NOTE — PROGRESS NOTES
HPI Notes    Name: Yeimi Montez  : 1950         Chief Complaint:     Chief Complaint   Patient presents with    Bradycardia     Follow up. Patient has appointment scheduled with cardiology in September. Chest xray scheduled for today.    Back Pain     Currently taking Diclofenac with no relief.        History of Present Illness:      HPI    This is a 73-year-old woman presenting for reevaluation of several conditions including chronic right-sided low back pain without sciatica, hypothyroidism as well as bradycardia.    Bradycardia: Cardiac workup outpatient has been completed, I did recommend that she establish with cardiology, and she does have an appointment to see Dr. Moss on 2024.  His outpatient imaging studies are to be completed today.    Hypothyroidism: Well-controlled with current levothyroxine dose, most recent TSH is in range.    Chronic right-sided low back pain without sciatica: she reports this problem has not particularly changed over the past year. It is very bothersome with cold temperatures, extended periods of time walking, standing and is of a stiff quality. Sitting provides noticeable relief. She has bought what sounds to be a home TENS unit and has used it once.     Past Medical History:     Past Medical History:   Diagnosis Date    Hyperlipidemia     Thyroid disease     Venous ulcer (HCC)     history      Reviewed all health maintenance requirements and ordered appropriate tests  Health Maintenance Due   Topic Date Due    Shingles vaccine (1 of 2) Never done    Respiratory Syncytial Virus (RSV) Pregnant or age 60 yrs+ (1 - 1-dose 60+ series) Never done    Breast cancer screen  2021    DTaP/Tdap/Td vaccine (2 - Td or Tdap) 06/10/2023    COVID-19 Vaccine (1 - -24 season) Never done    Annual Wellness Visit (Medicare Advantage)  2024    Flu vaccine (1) Never done       Past Surgical History:     Past Surgical History:   Procedure Laterality Date    APPENDECTOMY   family.    If you cannot score us as very good ( 5 Stars) on any question, please feel free to call the office to discuss how we could have made your experience exceptional.     Thank you.    Clinical Care Team:  DO Symone Liu LPN    Triage:  Nicole Ojeda CMA    Clerical Team:  Nicole Llamas        Electronically signed by Chandler Michele DO on 8/26/2024 at 9:13 AM           Completed Refills   Requested Prescriptions     Signed Prescriptions Disp Refills    lidocaine (LIDODERM) 5 % 30 patch 2     Sig: Place 1 patch onto the skin daily 12 hours on, 12 hours off.

## 2024-08-26 NOTE — PATIENT INSTRUCTIONS
SURVEY:    You may be receiving a survey from [facility] regarding your visit today.    You may get this in the mail, through your MyChart or in your email.     Please complete the survey to enable us to provide the highest quality of care to you and your family.    If you cannot score us as very good ( 5 Stars) on any question, please feel free to call the office to discuss how we could have made your experience exceptional.     Thank you.    Clinical Care Team:  Dr. Chandler Michele, DO Symone Gabriel LPN    Triage:  Nicole Ojeda CMA    Clerical Team:  Nicole Llamas

## 2024-09-05 ENCOUNTER — TELEPHONE (OUTPATIENT)
Dept: FAMILY MEDICINE CLINIC | Age: 74
End: 2024-09-05

## 2024-09-10 DIAGNOSIS — E78.2 MIXED HYPERLIPIDEMIA: ICD-10-CM

## 2024-09-10 DIAGNOSIS — N39.492 POSTURAL URINARY INCONTINENCE: ICD-10-CM

## 2024-09-10 RX ORDER — ATORVASTATIN CALCIUM 10 MG/1
10 TABLET, FILM COATED ORAL DAILY
Qty: 90 TABLET | Refills: 1 | Status: SHIPPED | OUTPATIENT
Start: 2024-09-10

## 2024-09-10 RX ORDER — OXYBUTYNIN CHLORIDE 10 MG/1
10 TABLET, EXTENDED RELEASE ORAL DAILY
Qty: 90 TABLET | Refills: 1 | Status: SHIPPED | OUTPATIENT
Start: 2024-09-10

## 2024-09-10 RX ORDER — LEVOTHYROXINE SODIUM 50 UG/1
50 TABLET ORAL DAILY
Qty: 90 TABLET | Refills: 1 | Status: SHIPPED | OUTPATIENT
Start: 2024-09-10

## 2024-09-18 ENCOUNTER — HOSPITAL ENCOUNTER (OUTPATIENT)
Age: 74
Discharge: HOME | End: 2024-09-20
Attending: INTERNAL MEDICINE
Payer: MEDICARE

## 2024-09-18 ENCOUNTER — OFFICE VISIT (OUTPATIENT)
Dept: CARDIOLOGY CLINIC | Age: 74
End: 2024-09-18
Payer: MEDICARE

## 2024-09-18 DIAGNOSIS — R06.02 SHORTNESS OF BREATH: ICD-10-CM

## 2024-09-18 DIAGNOSIS — R00.2 PALPITATIONS: ICD-10-CM

## 2024-09-18 DIAGNOSIS — R00.1 BRADYCARDIA: ICD-10-CM

## 2024-09-18 DIAGNOSIS — R00.2 PALPITATIONS: Primary | ICD-10-CM

## 2024-09-18 PROCEDURE — 1123F ACP DISCUSS/DSCN MKR DOCD: CPT | Performed by: INTERNAL MEDICINE

## 2024-09-18 PROCEDURE — 99204 OFFICE O/P NEW MOD 45 MIN: CPT | Performed by: INTERNAL MEDICINE

## 2024-09-18 PROCEDURE — 93246 EXT ECG>7D<15D RECORDING: CPT

## 2024-09-20 ENCOUNTER — HOSPITAL ENCOUNTER (OUTPATIENT)
Age: 74
Discharge: HOME | End: 2024-09-22
Attending: INTERNAL MEDICINE
Payer: MEDICARE

## 2024-09-20 DIAGNOSIS — R00.1 BRADYCARDIA: ICD-10-CM

## 2024-09-20 DIAGNOSIS — R06.02 SHORTNESS OF BREATH: ICD-10-CM

## 2024-09-20 DIAGNOSIS — R00.2 PALPITATIONS: ICD-10-CM

## 2024-09-20 LAB
ECHO AO ASC DIAM: 2.4 CM
ECHO AO ROOT DIAM: 2.8 CM
ECHO AV CUSP MM: 2.3 CM
ECHO AV PEAK GRADIENT: 5 MMHG
ECHO AV PEAK VELOCITY: 1.1 M/S
ECHO EST RA PRESSURE: 8 MMHG
ECHO LA DIAMETER: 3.5 CM
ECHO LA TO AORTIC ROOT RATIO: 1.25
ECHO LA VOL A-L A2C: 30 ML (ref 22–52)
ECHO LA VOL A-L A4C: 71 ML (ref 22–52)
ECHO LA VOL MOD A2C: 28 ML (ref 22–52)
ECHO LA VOL MOD A4C: 64 ML (ref 22–52)
ECHO LA VOLUME AREA LENGTH: 49 ML
ECHO LV E' LATERAL VELOCITY: 7.5 CM/S
ECHO LV E' SEPTAL VELOCITY: 8.2 CM/S
ECHO LV EDV A2C: 69 ML
ECHO LV EDV A4C: 85 ML
ECHO LV EDV BP: 77 ML (ref 56–104)
ECHO LV EJECTION FRACTION A2C: 46 %
ECHO LV EJECTION FRACTION A4C: 62 %
ECHO LV EJECTION FRACTION BIPLANE: 53 % (ref 55–100)
ECHO LV ESV A2C: 37 ML
ECHO LV ESV A4C: 33 ML
ECHO LV ESV BP: 36 ML (ref 19–49)
ECHO LV FRACTIONAL SHORTENING: 35 % (ref 28–44)
ECHO LV INTERNAL DIMENSION DIASTOLIC: 4.3 CM (ref 3.9–5.3)
ECHO LV INTERNAL DIMENSION SYSTOLIC: 2.8 CM
ECHO LV IVSD: 1.1 CM (ref 0.6–0.9)
ECHO LV MASS 2D: 184.7 G (ref 67–162)
ECHO LV POSTERIOR WALL DIASTOLIC: 1.3 CM (ref 0.6–0.9)
ECHO LV RELATIVE WALL THICKNESS RATIO: 0.6
ECHO LVOT AREA: 4.5 CM2
ECHO LVOT DIAM: 2.4 CM
ECHO MV A VELOCITY: 1.26 M/S
ECHO MV E DECELERATION TIME (DT): 254.8 MS
ECHO MV E VELOCITY: 0.86 M/S
ECHO MV E/A RATIO: 0.68
ECHO MV E/E' LATERAL: 11.47
ECHO MV E/E' RATIO (AVERAGED): 10.98
ECHO MV E/E' SEPTAL: 10.49
ECHO PV MAX VELOCITY: 0.9 M/S
ECHO PV PEAK GRADIENT: 3 MMHG
ECHO RIGHT VENTRICULAR SYSTOLIC PRESSURE (RVSP): 31 MMHG
ECHO RV INTERNAL DIMENSION: 3.2 CM
ECHO TV REGURGITANT MAX VELOCITY: 2.39 M/S
ECHO TV REGURGITANT PEAK GRADIENT: 23 MMHG

## 2024-09-20 PROCEDURE — 93306 TTE W/DOPPLER COMPLETE: CPT

## 2024-09-23 LAB
ECHO AO ASC DIAM: 2.4 CM
ECHO AO ROOT DIAM: 2.8 CM
ECHO AV CUSP MM: 2.3 CM
ECHO AV PEAK GRADIENT: 5 MMHG
ECHO AV PEAK VELOCITY: 1.1 M/S
ECHO EST RA PRESSURE: 8 MMHG
ECHO LA DIAMETER: 3.5 CM
ECHO LA TO AORTIC ROOT RATIO: 1.25
ECHO LA VOL A-L A2C: 30 ML (ref 22–52)
ECHO LA VOL A-L A4C: 71 ML (ref 22–52)
ECHO LA VOL MOD A2C: 28 ML (ref 22–52)
ECHO LA VOL MOD A4C: 64 ML (ref 22–52)
ECHO LA VOLUME AREA LENGTH: 49 ML
ECHO LV E' LATERAL VELOCITY: 7.5 CM/S
ECHO LV E' SEPTAL VELOCITY: 8.2 CM/S
ECHO LV EDV A2C: 69 ML
ECHO LV EDV A4C: 85 ML
ECHO LV EDV BP: 77 ML (ref 56–104)
ECHO LV EF PHYSICIAN: 60 %
ECHO LV EJECTION FRACTION A2C: 46 %
ECHO LV EJECTION FRACTION A4C: 62 %
ECHO LV EJECTION FRACTION BIPLANE: 53 % (ref 55–100)
ECHO LV ESV A2C: 37 ML
ECHO LV ESV A4C: 33 ML
ECHO LV ESV BP: 36 ML (ref 19–49)
ECHO LV FRACTIONAL SHORTENING: 35 % (ref 28–44)
ECHO LV INTERNAL DIMENSION DIASTOLIC: 4.3 CM (ref 3.9–5.3)
ECHO LV INTERNAL DIMENSION SYSTOLIC: 2.8 CM
ECHO LV IVSD: 1.1 CM (ref 0.6–0.9)
ECHO LV MASS 2D: 184.7 G (ref 67–162)
ECHO LV POSTERIOR WALL DIASTOLIC: 1.3 CM (ref 0.6–0.9)
ECHO LV RELATIVE WALL THICKNESS RATIO: 0.6
ECHO LVOT AREA: 4.5 CM2
ECHO LVOT DIAM: 2.4 CM
ECHO MV A VELOCITY: 1.26 M/S
ECHO MV E DECELERATION TIME (DT): 254.8 MS
ECHO MV E VELOCITY: 0.86 M/S
ECHO MV E/A RATIO: 0.68
ECHO MV E/E' LATERAL: 11.47
ECHO MV E/E' RATIO (AVERAGED): 10.98
ECHO MV E/E' SEPTAL: 10.49
ECHO PV MAX VELOCITY: 0.9 M/S
ECHO PV PEAK GRADIENT: 3 MMHG
ECHO RIGHT VENTRICULAR SYSTOLIC PRESSURE (RVSP): 31 MMHG
ECHO RV INTERNAL DIMENSION: 3.2 CM
ECHO TV REGURGITANT MAX VELOCITY: 2.39 M/S
ECHO TV REGURGITANT PEAK GRADIENT: 23 MMHG

## 2024-09-23 PROCEDURE — 93306 TTE W/DOPPLER COMPLETE: CPT | Performed by: INTERNAL MEDICINE

## 2024-10-01 ENCOUNTER — HOSPITAL ENCOUNTER (OUTPATIENT)
Dept: NUCLEAR MEDICINE | Age: 74
Discharge: HOME | End: 2024-10-03
Attending: INTERNAL MEDICINE
Payer: MEDICARE

## 2024-10-01 ENCOUNTER — HOSPITAL ENCOUNTER (OUTPATIENT)
Age: 74
Discharge: HOME | End: 2024-10-03
Attending: INTERNAL MEDICINE
Payer: MEDICARE

## 2024-10-01 VITALS — DIASTOLIC BLOOD PRESSURE: 46 MMHG | SYSTOLIC BLOOD PRESSURE: 184 MMHG | HEART RATE: 79 BPM

## 2024-10-01 DIAGNOSIS — R06.02 SHORTNESS OF BREATH: ICD-10-CM

## 2024-10-01 DIAGNOSIS — R00.1 BRADYCARDIA: ICD-10-CM

## 2024-10-01 DIAGNOSIS — R00.2 PALPITATIONS: ICD-10-CM

## 2024-10-01 LAB
NUC STRESS EJECTION FRACTION: 81 %
NUC STRESS LV EDV: 69 ML (ref 56–104)
NUC STRESS LV ESV: 13 ML (ref 19–49)
STRESS BASELINE DIAS BP: 77 MMHG
STRESS BASELINE HR: 86 BPM
STRESS BASELINE SYS BP: 147 MMHG
STRESS ESTIMATED WORKLOAD: 1 METS
STRESS PEAK DIAS BP: 51 MMHG
STRESS PEAK SYS BP: 195 MMHG
STRESS PERCENT HR ACHIEVED: 61 %
STRESS POST PEAK HR: 91 BPM
STRESS RATE PRESSURE PRODUCT: ABNORMAL BPM*MMHG
STRESS TARGET HR: 148 BPM
TID: 1.02

## 2024-10-01 PROCEDURE — 2580000003 HC RX 258: Performed by: INTERNAL MEDICINE

## 2024-10-01 PROCEDURE — A9500 TC99M SESTAMIBI: HCPCS | Performed by: INTERNAL MEDICINE

## 2024-10-01 PROCEDURE — 78452 HT MUSCLE IMAGE SPECT MULT: CPT

## 2024-10-01 PROCEDURE — 6360000002 HC RX W HCPCS: Performed by: INTERNAL MEDICINE

## 2024-10-01 PROCEDURE — 3430000000 HC RX DIAGNOSTIC RADIOPHARMACEUTICAL: Performed by: INTERNAL MEDICINE

## 2024-10-01 PROCEDURE — 93017 CV STRESS TEST TRACING ONLY: CPT

## 2024-10-01 RX ORDER — SODIUM CHLORIDE 0.9 % (FLUSH) 0.9 %
5-40 SYRINGE (ML) INJECTION PRN
Status: ACTIVE | OUTPATIENT
Start: 2024-10-01 | End: 2024-10-01

## 2024-10-01 RX ORDER — REGADENOSON 0.08 MG/ML
0.4 INJECTION, SOLUTION INTRAVENOUS
Status: COMPLETED | OUTPATIENT
Start: 2024-10-01 | End: 2024-10-01

## 2024-10-01 RX ORDER — AMINOPHYLLINE 25 MG/ML
50 INJECTION, SOLUTION INTRAVENOUS PRN
Status: DISPENSED | OUTPATIENT
Start: 2024-10-01 | End: 2024-10-01

## 2024-10-01 RX ORDER — TETRAKIS(2-METHOXYISOBUTYLISOCYANIDE)COPPER(I) TETRAFLUOROBORATE 1 MG/ML
10 INJECTION, POWDER, LYOPHILIZED, FOR SOLUTION INTRAVENOUS
Status: COMPLETED | OUTPATIENT
Start: 2024-10-01 | End: 2024-10-01

## 2024-10-01 RX ORDER — TETRAKIS(2-METHOXYISOBUTYLISOCYANIDE)COPPER(I) TETRAFLUOROBORATE 1 MG/ML
30 INJECTION, POWDER, LYOPHILIZED, FOR SOLUTION INTRAVENOUS
Status: COMPLETED | OUTPATIENT
Start: 2024-10-01 | End: 2024-10-01

## 2024-10-01 RX ADMIN — SODIUM CHLORIDE, PRESERVATIVE FREE 10 ML: 5 INJECTION INTRAVENOUS at 09:57

## 2024-10-01 RX ADMIN — TETRAKIS(2-METHOXYISOBUTYLISOCYANIDE)COPPER(I) TETRAFLUOROBORATE 30 MILLICURIE: 1 INJECTION, POWDER, LYOPHILIZED, FOR SOLUTION INTRAVENOUS at 10:00

## 2024-10-01 RX ADMIN — REGADENOSON 0.4 MG: 0.08 INJECTION, SOLUTION INTRAVENOUS at 09:57

## 2024-10-01 RX ADMIN — TETRAKIS(2-METHOXYISOBUTYLISOCYANIDE)COPPER(I) TETRAFLUOROBORATE 10 MILLICURIE: 1 INJECTION, POWDER, LYOPHILIZED, FOR SOLUTION INTRAVENOUS at 08:50

## 2024-10-07 ENCOUNTER — TELEPHONE (OUTPATIENT)
Dept: CARDIOLOGY CLINIC | Age: 74
End: 2024-10-07

## 2024-10-07 NOTE — TELEPHONE ENCOUNTER
Tried to call pt no answer unable to leave msg   Per recommendation from holter monitor   Will need to add Toprol XL 25 mg daily   Per Dr. Moss

## 2024-10-08 RX ORDER — METOPROLOL SUCCINATE 25 MG/1
25 TABLET, EXTENDED RELEASE ORAL DAILY
Qty: 30 TABLET | Refills: 3 | Status: SHIPPED | OUTPATIENT
Start: 2024-10-08

## 2024-10-16 ENCOUNTER — OFFICE VISIT (OUTPATIENT)
Dept: CARDIOLOGY CLINIC | Age: 74
End: 2024-10-16
Payer: MEDICARE

## 2024-10-16 VITALS
SYSTOLIC BLOOD PRESSURE: 147 MMHG | HEART RATE: 64 BPM | RESPIRATION RATE: 16 BRPM | WEIGHT: 240 LBS | DIASTOLIC BLOOD PRESSURE: 66 MMHG | OXYGEN SATURATION: 95 % | BODY MASS INDEX: 37.59 KG/M2

## 2024-10-16 DIAGNOSIS — R06.02 SHORTNESS OF BREATH: ICD-10-CM

## 2024-10-16 DIAGNOSIS — R00.2 PALPITATIONS: Primary | ICD-10-CM

## 2024-10-16 DIAGNOSIS — E78.2 MIXED HYPERLIPIDEMIA: ICD-10-CM

## 2024-10-16 DIAGNOSIS — E55.9 VITAMIN D DEFICIENCY DISEASE: ICD-10-CM

## 2024-10-16 DIAGNOSIS — E03.9 ACQUIRED HYPOTHYROIDISM: ICD-10-CM

## 2024-10-16 PROCEDURE — 1123F ACP DISCUSS/DSCN MKR DOCD: CPT | Performed by: NURSE PRACTITIONER

## 2024-10-16 PROCEDURE — 99214 OFFICE O/P EST MOD 30 MIN: CPT | Performed by: NURSE PRACTITIONER

## 2024-10-16 NOTE — PROGRESS NOTES
[facility]  [address] [place], [place] [address]  Phone: [phone], Fax: [phone]     Patient: Yeimi Montez  : 1950  Date of Visit: 2024    REASON FOR VISIT / CONSULTATION: Palpitations      History of Present Illness:        Dear Chandler Michele DO    We had the pleasure of seeing Yeimi Montez in our office today. Ms. Montez is a pleasant 74 y.o. female who was seen by Dr. Moss on 2024 for bradycardia with occasional tachyarrhythmias, palpitations and dizziness.  She has not had any cardiac issues in the past.  She has a watch that shows her if her heart rate is too low or too fast.  Dr. Micehle ordered a 48 Hour Holter monitor that she started on 2024 that showed NSR with rare PVC's and many PAC's.    On 2024, she had a 14 Day E-Patch that showed heart rate 38 to 120 with average heart rate 73 bpm.  She had many PAC's and rare PVC's with multiple short episodes of SVT in 120 range up to 22 beat runs.  She had 3 ventricular events at 3-11 beats with fastest rate of 196 bpm.  She denies having any palpitations while wearing monitor.  She was called on 10/07/2024 to start Toprol XL 25 mg daily.  She has not had any further palpitations since she started Toprol XL.    On 2024, she had an echocardiogram that showed normal LV function with EF 60%.  Mild mitral and tricuspid regurgitation.  Moderate LVH with diastolic dysfunction.    She had a Lexiscan stress test on 10/01/2024 that suggested small area of apical ischemia.  Low risk study.  Left ventricular ejection fraction was 81%.  She has never had a MI or cardiac catheterization.    She denies any abdominal pain, nausea or vomiting.  No PND, orthopnea or pedal edema.  Denies any dizziness, lightheadedness or fainting spells.  She is walking well with no unusual shortness of breath.  She denies any change in energy level.    Ms. Montez denies any chest pain now or in the recent

## 2024-10-16 NOTE — PROGRESS NOTES
Jah Roblesines CNP  To review stress test and echo   No chest pain or sob  No edema  Occ dizziness.  No palpitations.

## 2024-10-21 ENCOUNTER — OFFICE VISIT (OUTPATIENT)
Dept: FAMILY MEDICINE CLINIC | Age: 74
End: 2024-10-21

## 2024-10-21 VITALS
OXYGEN SATURATION: 96 % | HEART RATE: 66 BPM | WEIGHT: 240 LBS | DIASTOLIC BLOOD PRESSURE: 64 MMHG | SYSTOLIC BLOOD PRESSURE: 132 MMHG | HEIGHT: 67 IN | BODY MASS INDEX: 37.67 KG/M2

## 2024-10-21 DIAGNOSIS — Z00.00 MEDICARE ANNUAL WELLNESS VISIT, SUBSEQUENT: Primary | ICD-10-CM

## 2024-10-21 DIAGNOSIS — Z87.891 PERSONAL HISTORY OF TOBACCO USE: ICD-10-CM

## 2024-10-21 DIAGNOSIS — Z12.31 ENCOUNTER FOR SCREENING MAMMOGRAM FOR BREAST CANCER: ICD-10-CM

## 2024-10-21 DIAGNOSIS — M54.50 CHRONIC RIGHT-SIDED LOW BACK PAIN WITHOUT SCIATICA: ICD-10-CM

## 2024-10-21 DIAGNOSIS — G89.29 CHRONIC RIGHT-SIDED LOW BACK PAIN WITHOUT SCIATICA: ICD-10-CM

## 2024-10-21 SDOH — HEALTH STABILITY: PHYSICAL HEALTH: ON AVERAGE, HOW MANY DAYS PER WEEK DO YOU ENGAGE IN MODERATE TO STRENUOUS EXERCISE (LIKE A BRISK WALK)?: 0 DAYS

## 2024-10-21 ASSESSMENT — PATIENT HEALTH QUESTIONNAIRE - PHQ9
SUM OF ALL RESPONSES TO PHQ QUESTIONS 1-9: 0
SUM OF ALL RESPONSES TO PHQ QUESTIONS 1-9: 0
2. FEELING DOWN, DEPRESSED OR HOPELESS: NOT AT ALL
SUM OF ALL RESPONSES TO PHQ QUESTIONS 1-9: 0
SUM OF ALL RESPONSES TO PHQ9 QUESTIONS 1 & 2: 0
SUM OF ALL RESPONSES TO PHQ QUESTIONS 1-9: 0
1. LITTLE INTEREST OR PLEASURE IN DOING THINGS: NOT AT ALL

## 2024-10-21 ASSESSMENT — LIFESTYLE VARIABLES
HOW MANY STANDARD DRINKS CONTAINING ALCOHOL DO YOU HAVE ON A TYPICAL DAY: PATIENT DOES NOT DRINK
HOW OFTEN DO YOU HAVE SIX OR MORE DRINKS ON ONE OCCASION: 1
HOW OFTEN DO YOU HAVE A DRINK CONTAINING ALCOHOL: 1
HOW OFTEN DO YOU HAVE A DRINK CONTAINING ALCOHOL: NEVER
HOW MANY STANDARD DRINKS CONTAINING ALCOHOL DO YOU HAVE ON A TYPICAL DAY: 0

## 2024-10-21 NOTE — PATIENT INSTRUCTIONS
SURVEY:    You may be receiving a survey from [employer] regarding your visit today.    You may get this in the mail, through your MyChart or in your email.     Please complete the survey to enable us to provide the highest quality of care to you and your family.    If you cannot score us as very good ( 5 Stars) on any question, please feel free to call the office to discuss how we could have made your experience exceptional.     Thank you.    Clinical Care Team:  DO Symone Liu LPN    Triage:  Nicole Ojeda CMA    Clerical Team:  Nicole Llamas         Learning About Being Active as an Older Adult  Why is being active important as you get older?     Being active is one of the best things you can do for your health. And it's never too late to start. Being active--or getting active, if you aren't already--has definite benefits. It can:  Give you more energy,  Keep your mind sharp.  Improve balance to reduce your risk of falls.  Help you manage chronic illness with fewer medicines.  No matter how old you are, how fit you are, or what health problems you have, there is a form of activity that will work for you. And the more physical activity you can do, the better your overall health will be.  What kinds of activity can help you stay healthy?  Being more active will make your daily activities easier. Physical activity includes planned exercise and things you do in daily life. There are four types of activity:  Aerobic.  Doing aerobic activity makes your heart and lungs strong.  Includes walking, dancing, and gardening.  Aim for at least 2½ hours spread throughout the week.  It improves your energy and can help you sleep better.  Muscle-strengthening.  This type of activity can help maintain muscle and strengthen bones.  Includes climbing stairs, using resistance bands, and

## 2024-10-21 NOTE — PROGRESS NOTES
Medicare Annual Wellness Visit    Yeimi Montez is here for Medicare AWV    Assessment & Plan   Medicare annual wellness visit, subsequent  Personal history of tobacco use  -     KS VISIT TO DISCUSS LUNG CA SCREEN W LDCT  -     CT Lung Screen (Initial/Annual/Baseline); Future  Encounter for screening mammogram for breast cancer  -     DOMONIQUE Digital Screen Bilateral; Future  Chronic right-sided low back pain without sciatica    Recommendations for Preventive Services Due: see orders and patient instructions/AVS.  Recommended screening schedule for the next 5-10 years is provided to the patient in written form: see Patient Instructions/AVS.     Return in 6 months (on 4/21/2025).     Subjective   The following acute and/or chronic problems were also addressed today:  Chronic LBP without sciatica: this problem is under good control from previous intervention.     Patient's complete Health Risk Assessment and screening values have been reviewed and are found in Flowsheets. The following problems were reviewed today and where indicated follow up appointments were made and/or referrals ordered.    Positive Risk Factor Screenings with Interventions:                Inactivity:  On average, how many days per week do you engage in moderate to strenuous exercise (like a brisk walk)?: 0 days (!) Abnormal     Interventions:  Patient advised to follow up in the office for further evaluation and treatment     Abnormal BMI (obese):  Body mass index is 37.59 kg/m². (!) Abnormal  Interventions:  Patient advised to follow-up in this office for further evaluation and treatment        Dentist Screen:  Have you seen the dentist within the past year?: (!) No    Intervention:  Advised to schedule with their dentist     Vision Screen:  Do you have difficulty driving, watching TV, or doing any of your daily activities because of your eyesight?: (!) Yes  Have you had an eye exam within the past year?: Yes  Interventions:   Patient encouraged to

## 2024-11-13 ENCOUNTER — TELEPHONE (OUTPATIENT)
Dept: FAMILY MEDICINE CLINIC | Age: 74
End: 2024-11-13

## 2024-12-19 ENCOUNTER — OFFICE VISIT (OUTPATIENT)
Dept: FAMILY MEDICINE CLINIC | Age: 74
End: 2024-12-19

## 2024-12-19 VITALS
WEIGHT: 246 LBS | OXYGEN SATURATION: 96 % | BODY MASS INDEX: 38.53 KG/M2 | SYSTOLIC BLOOD PRESSURE: 138 MMHG | DIASTOLIC BLOOD PRESSURE: 70 MMHG | HEART RATE: 70 BPM

## 2024-12-19 DIAGNOSIS — E66.813 CLASS 3 OBESITY: Primary | ICD-10-CM

## 2024-12-19 DIAGNOSIS — Z76.0 MEDICATION REFILL: ICD-10-CM

## 2024-12-19 PROBLEM — E66.01 MORBIDLY OBESE: Status: RESOLVED | Noted: 2020-09-14 | Resolved: 2024-12-19

## 2024-12-19 ASSESSMENT — ENCOUNTER SYMPTOMS
ABDOMINAL PAIN: 0
DIARRHEA: 0
SINUS PAIN: 0
BACK PAIN: 0
VOMITING: 0
NAUSEA: 0
WHEEZING: 0
RHINORRHEA: 0
COUGH: 0

## 2024-12-19 NOTE — PROGRESS NOTES
HPI Notes    Name: Yeimi Montez  : 1950         Chief Complaint:     Chief Complaint   Patient presents with    Weight Management     Patient was not focused on managing weight due to recent surgery.        History of Present Illness:        HPI    This is a 74-year-old woman with class III obesity presenting for evaluation of the same condition.  She would like to make losing weight and moving towards a healthier weight for her height a main priority for her health. She sleeps very fitfully due to her problems with urinary incontinence and drinks very little water throughout the day. She has restarted semaglutide at a 0.25 mg weekly dose last month and is doing well. She is in need of a refill.     Past Medical History:     Past Medical History:   Diagnosis Date    Hyperlipidemia     Thyroid disease     Venous ulcer (HCC)     history      Reviewed all health maintenance requirements and ordered appropriate tests  Health Maintenance Due   Topic Date Due    Shingles vaccine (1 of 2) Never done    Breast cancer screen  2021    DTaP/Tdap/Td vaccine (2 - Td or Tdap) 06/10/2023    Flu vaccine (1) Never done    COVID-19 Vaccine (1 - 2023-24 season) Never done    Lung Cancer Screening &/or Counseling  2024       Past Surgical History:     Past Surgical History:   Procedure Laterality Date    APPENDECTOMY  2018    ruptured appendix    BLADDER SUSPENSION      COLONOSCOPY      HEMORRHOID SURGERY      HYSTERECTOMY (CERVIX STATUS UNKNOWN)      TUBAL LIGATION      VEIN SURGERY      Lt leg \"vein removed\"        Medications:       Prior to Admission medications    Medication Sig Start Date End Date Taking? Authorizing Provider   metoprolol succinate (TOPROL XL) 25 MG extended release tablet Take 1 tablet by mouth daily 10/8/24  Yes Bhavin Moss MD   atorvastatin (LIPITOR) 10 MG tablet TAKE 1 TABLET DAILY 9/10/24  Yes Chandler Michele DO   levothyroxine (SYNTHROID) 50 MCG tablet TAKE 1 TABLET DAILY

## 2024-12-19 NOTE — PATIENT INSTRUCTIONS
SURVEY:    You may be receiving a survey from [facility] regarding your visit today.    You may get this in the mail, through your MyChart or in your email.     Please complete the survey to enable us to provide the highest quality of care to you and your family.    If you cannot score us as very good ( 5 Stars) on any question, please feel free to call the office to discuss how we could have made your experience exceptional.     Thank you.    Clinical Care Team:  Dr. Chandler Michele, DO Symone Gabriel LPN    Triage:  Nicole Ojeda CMA    Clerical Team:  Nicole Llamas

## 2025-01-22 RX ORDER — METOPROLOL SUCCINATE 25 MG/1
25 TABLET, EXTENDED RELEASE ORAL DAILY
Qty: 30 TABLET | Refills: 3 | Status: SHIPPED | OUTPATIENT
Start: 2025-01-22

## 2025-02-16 ENCOUNTER — APPOINTMENT (OUTPATIENT)
Dept: CT IMAGING | Age: 75
End: 2025-02-16
Payer: MEDICARE

## 2025-02-16 ENCOUNTER — HOSPITAL ENCOUNTER (EMERGENCY)
Age: 75
Discharge: ANOTHER HOSPITAL | End: 2025-02-16
Attending: EMERGENCY MEDICINE
Payer: MEDICARE

## 2025-02-16 ENCOUNTER — HOSPITAL ENCOUNTER (INPATIENT)
Age: 75
LOS: 3 days | Discharge: HOME | DRG: 395 | End: 2025-02-19
Attending: EMERGENCY MEDICINE | Admitting: INTERNAL MEDICINE
Payer: MEDICARE

## 2025-02-16 VITALS
BODY MASS INDEX: 39.94 KG/M2 | RESPIRATION RATE: 18 BRPM | SYSTOLIC BLOOD PRESSURE: 149 MMHG | TEMPERATURE: 98.3 F | HEART RATE: 63 BPM | OXYGEN SATURATION: 92 % | DIASTOLIC BLOOD PRESSURE: 64 MMHG | WEIGHT: 254.5 LBS | HEIGHT: 67 IN

## 2025-02-16 DIAGNOSIS — K57.90 DIVERTICULOSIS: ICD-10-CM

## 2025-02-16 DIAGNOSIS — N39.0 URINARY TRACT INFECTION WITH HEMATURIA, SITE UNSPECIFIED: ICD-10-CM

## 2025-02-16 DIAGNOSIS — K61.1 PERIRECTAL ABSCESS: Primary | ICD-10-CM

## 2025-02-16 DIAGNOSIS — R31.9 URINARY TRACT INFECTION WITH HEMATURIA, SITE UNSPECIFIED: ICD-10-CM

## 2025-02-16 LAB
-: ABNORMAL
ALBUMIN SERPL-MCNC: 3.1 G/DL (ref 3.5–5.2)
ALBUMIN SERPL-MCNC: 3.3 G/DL (ref 3.5–5.2)
ALBUMIN/GLOB SERPL: 0.8 {RATIO} (ref 1–2.5)
ALBUMIN/GLOB SERPL: 0.9 {RATIO} (ref 1–2.5)
ALP SERPL-CCNC: 123 U/L (ref 35–104)
ALP SERPL-CCNC: 88 U/L (ref 35–104)
ALT SERPL-CCNC: 19 U/L (ref 5–33)
ALT SERPL-CCNC: 22 U/L (ref 10–35)
ANION GAP SERPL CALCULATED.3IONS-SCNC: 11 MMOL/L (ref 9–16)
ANION GAP SERPL CALCULATED.3IONS-SCNC: 14 MMOL/L (ref 9–17)
AST SERPL-CCNC: 16 U/L
AST SERPL-CCNC: 16 U/L (ref 10–35)
BACTERIA URNS QL MICRO: ABNORMAL
BASOPHILS # BLD: 0.01 K/UL (ref 0–0.2)
BASOPHILS # BLD: 0.05 K/UL (ref 0–0.2)
BASOPHILS NFR BLD: 0 % (ref 0–2)
BASOPHILS NFR BLD: 0 % (ref 0–2)
BILIRUB SERPL-MCNC: 0.4 MG/DL (ref 0.3–1.2)
BILIRUB SERPL-MCNC: 0.4 MG/DL (ref 0–1.2)
BILIRUB UR QL STRIP: NEGATIVE
BUN SERPL-MCNC: 14 MG/DL (ref 8–23)
BUN SERPL-MCNC: 15 MG/DL (ref 8–23)
CALCIUM SERPL-MCNC: 8.4 MG/DL (ref 8.6–10.4)
CALCIUM SERPL-MCNC: 8.5 MG/DL (ref 8.6–10.4)
CHLORIDE SERPL-SCNC: 100 MMOL/L (ref 98–107)
CHLORIDE SERPL-SCNC: 103 MMOL/L (ref 98–107)
CLARITY UR: ABNORMAL
CO2 SERPL-SCNC: 21 MMOL/L (ref 20–31)
CO2 SERPL-SCNC: 22 MMOL/L (ref 20–31)
COLOR UR: YELLOW
COMMENT: ABNORMAL
CREAT SERPL-MCNC: 0.7 MG/DL (ref 0.6–0.9)
CREAT SERPL-MCNC: 0.9 MG/DL (ref 0.5–0.9)
CRP SERPL HS-MCNC: 178 MG/L (ref 0–5)
EOSINOPHIL # BLD: 0.38 K/UL (ref 0–0.4)
EOSINOPHIL # BLD: 0.49 K/UL (ref 0–0.44)
EOSINOPHILS RELATIVE PERCENT: 2 % (ref 0–5)
EOSINOPHILS RELATIVE PERCENT: 3 % (ref 1–4)
EPI CELLS #/AREA URNS HPF: ABNORMAL /HPF
ERYTHROCYTE [DISTWIDTH] IN BLOOD BY AUTOMATED COUNT: 12.7 % (ref 12.1–15.2)
ERYTHROCYTE [DISTWIDTH] IN BLOOD BY AUTOMATED COUNT: 12.8 % (ref 11.8–14.4)
ERYTHROCYTE [SEDIMENTATION RATE] IN BLOOD BY PHOTOMETRIC METHOD: 87 MM/HR (ref 0–30)
GFR, ESTIMATED: 68 ML/MIN/1.73M2
GFR, ESTIMATED: >90 ML/MIN/1.73M2
GLUCOSE SERPL-MCNC: 113 MG/DL (ref 70–99)
GLUCOSE SERPL-MCNC: 95 MG/DL (ref 74–99)
GLUCOSE UR STRIP-MCNC: NEGATIVE MG/DL
HCT VFR BLD AUTO: 37.3 % (ref 36.3–47.1)
HCT VFR BLD AUTO: 38.7 % (ref 36–46)
HGB BLD-MCNC: 12.1 G/DL (ref 11.9–15.1)
HGB BLD-MCNC: 13 G/DL (ref 12–16)
HGB UR QL STRIP.AUTO: ABNORMAL
IMM GRANULOCYTES # BLD AUTO: 0.07 K/UL (ref 0–0.3)
IMM GRANULOCYTES # BLD AUTO: 0.07 K/UL (ref 0–0.3)
IMM GRANULOCYTES NFR BLD: 0 %
IMM GRANULOCYTES NFR BLD: 0 % (ref 0–5)
INR PPP: 1.3
KETONES UR STRIP-MCNC: NEGATIVE MG/DL
LACTIC ACID, SEPSIS WHOLE BLOOD: 1.4 MMOL/L (ref 0.5–1.9)
LACTIC ACID, SEPSIS WHOLE BLOOD: 1.6 MMOL/L (ref 0.5–1.9)
LACTIC ACID, SEPSIS WHOLE BLOOD: 1.7 MMOL/L (ref 0.5–1.9)
LEUKOCYTE ESTERASE UR QL STRIP: ABNORMAL
LYMPHOCYTES NFR BLD: 1.62 K/UL (ref 1–4.8)
LYMPHOCYTES NFR BLD: 1.8 K/UL (ref 1.1–3.7)
LYMPHOCYTES RELATIVE PERCENT: 11 % (ref 24–43)
LYMPHOCYTES RELATIVE PERCENT: 9 % (ref 15–40)
MCH RBC QN AUTO: 29.2 PG (ref 25.2–33.5)
MCH RBC QN AUTO: 29.5 PG (ref 26–34)
MCHC RBC AUTO-ENTMCNC: 32.4 G/DL (ref 28.4–34.8)
MCHC RBC AUTO-ENTMCNC: 33.6 G/DL (ref 31–37)
MCV RBC AUTO: 88 FL (ref 80–100)
MCV RBC AUTO: 89.9 FL (ref 82.6–102.9)
MONOCYTES NFR BLD: 1.15 K/UL (ref 0.1–1.2)
MONOCYTES NFR BLD: 1.29 K/UL (ref 0–1)
MONOCYTES NFR BLD: 7 % (ref 3–12)
MONOCYTES NFR BLD: 7 % (ref 4–8)
NEUTROPHILS NFR BLD: 79 % (ref 36–65)
NEUTROPHILS NFR BLD: 82 % (ref 47–75)
NEUTS SEG NFR BLD: 13.32 K/UL (ref 1.5–8.1)
NEUTS SEG NFR BLD: 15.09 K/UL (ref 2.5–7)
NITRITE UR QL STRIP: NEGATIVE
NRBC BLD-RTO: 0 PER 100 WBC
PH UR STRIP: 5 [PH] (ref 5–8)
PLATELET # BLD AUTO: 476 K/UL (ref 138–453)
PLATELET # BLD AUTO: 491 K/UL (ref 140–450)
PMV BLD AUTO: 9 FL (ref 6–12)
PMV BLD AUTO: 9.4 FL (ref 8.1–13.5)
POTASSIUM SERPL-SCNC: 4.1 MMOL/L (ref 3.7–5.3)
POTASSIUM SERPL-SCNC: 4.2 MMOL/L (ref 3.7–5.3)
PROT SERPL-MCNC: 6.9 G/DL (ref 6.6–8.7)
PROT SERPL-MCNC: 7 G/DL (ref 6.4–8.3)
PROT UR STRIP-MCNC: ABNORMAL MG/DL
PROTHROMBIN TIME: 15.5 SEC (ref 11.7–14.9)
RBC # BLD AUTO: 4.15 M/UL (ref 3.95–5.11)
RBC # BLD AUTO: 4.4 M/UL (ref 4–5.2)
RBC #/AREA URNS HPF: ABNORMAL /HPF (ref 0–2)
SODIUM SERPL-SCNC: 135 MMOL/L (ref 136–145)
SODIUM SERPL-SCNC: 136 MMOL/L (ref 135–144)
SP GR UR STRIP: 1.01 (ref 1–1.03)
UROBILINOGEN UR STRIP-ACNC: NORMAL EU/DL (ref 0–1)
WBC #/AREA URNS HPF: ABNORMAL /HPF
WBC OTHER # BLD: 16.9 K/UL (ref 3.5–11.3)
WBC OTHER # BLD: 18.5 K/UL (ref 3.5–11)

## 2025-02-16 PROCEDURE — 6370000000 HC RX 637 (ALT 250 FOR IP)

## 2025-02-16 PROCEDURE — 86140 C-REACTIVE PROTEIN: CPT

## 2025-02-16 PROCEDURE — 2580000003 HC RX 258

## 2025-02-16 PROCEDURE — 2500000003 HC RX 250 WO HCPCS: Performed by: EMERGENCY MEDICINE

## 2025-02-16 PROCEDURE — 93005 ELECTROCARDIOGRAM TRACING: CPT | Performed by: EMERGENCY MEDICINE

## 2025-02-16 PROCEDURE — 6360000002 HC RX W HCPCS: Performed by: EMERGENCY MEDICINE

## 2025-02-16 PROCEDURE — 81001 URINALYSIS AUTO W/SCOPE: CPT

## 2025-02-16 PROCEDURE — 85025 COMPLETE CBC W/AUTO DIFF WBC: CPT

## 2025-02-16 PROCEDURE — 96365 THER/PROPH/DIAG IV INF INIT: CPT

## 2025-02-16 PROCEDURE — 6360000002 HC RX W HCPCS

## 2025-02-16 PROCEDURE — 2580000003 HC RX 258: Performed by: EMERGENCY MEDICINE

## 2025-02-16 PROCEDURE — 74177 CT ABD & PELVIS W/CONTRAST: CPT

## 2025-02-16 PROCEDURE — 99285 EMERGENCY DEPT VISIT HI MDM: CPT

## 2025-02-16 PROCEDURE — 96375 TX/PRO/DX INJ NEW DRUG ADDON: CPT

## 2025-02-16 PROCEDURE — 6360000004 HC RX CONTRAST MEDICATION: Performed by: EMERGENCY MEDICINE

## 2025-02-16 PROCEDURE — 96376 TX/PRO/DX INJ SAME DRUG ADON: CPT

## 2025-02-16 PROCEDURE — 2060000000 HC ICU INTERMEDIATE R&B

## 2025-02-16 PROCEDURE — 36415 COLL VENOUS BLD VENIPUNCTURE: CPT

## 2025-02-16 PROCEDURE — 87086 URINE CULTURE/COLONY COUNT: CPT

## 2025-02-16 PROCEDURE — 85652 RBC SED RATE AUTOMATED: CPT

## 2025-02-16 PROCEDURE — 80053 COMPREHEN METABOLIC PANEL: CPT

## 2025-02-16 PROCEDURE — 99222 1ST HOSP IP/OBS MODERATE 55: CPT | Performed by: SURGERY

## 2025-02-16 PROCEDURE — 87040 BLOOD CULTURE FOR BACTERIA: CPT

## 2025-02-16 PROCEDURE — 85610 PROTHROMBIN TIME: CPT

## 2025-02-16 PROCEDURE — 83605 ASSAY OF LACTIC ACID: CPT

## 2025-02-16 RX ORDER — METRONIDAZOLE 500 MG/100ML
500 INJECTION, SOLUTION INTRAVENOUS ONCE
Status: COMPLETED | OUTPATIENT
Start: 2025-02-16 | End: 2025-02-16

## 2025-02-16 RX ORDER — GUAIFENESIN 600 MG/1
600 TABLET, EXTENDED RELEASE ORAL 2 TIMES DAILY
Status: DISCONTINUED | OUTPATIENT
Start: 2025-02-16 | End: 2025-02-19 | Stop reason: HOSPADM

## 2025-02-16 RX ORDER — SODIUM CHLORIDE 0.9 % (FLUSH) 0.9 %
5-40 SYRINGE (ML) INJECTION PRN
Status: DISCONTINUED | OUTPATIENT
Start: 2025-02-16 | End: 2025-02-19 | Stop reason: HOSPADM

## 2025-02-16 RX ORDER — MORPHINE SULFATE 4 MG/ML
4 INJECTION INTRAVENOUS ONCE
Status: COMPLETED | OUTPATIENT
Start: 2025-02-16 | End: 2025-02-16

## 2025-02-16 RX ORDER — FENTANYL CITRATE 50 UG/ML
50 INJECTION, SOLUTION INTRAMUSCULAR; INTRAVENOUS ONCE
Status: DISCONTINUED | OUTPATIENT
Start: 2025-02-16 | End: 2025-02-16

## 2025-02-16 RX ORDER — OXYBUTYNIN CHLORIDE 10 MG/1
10 TABLET, EXTENDED RELEASE ORAL DAILY
Status: DISCONTINUED | OUTPATIENT
Start: 2025-02-16 | End: 2025-02-19 | Stop reason: HOSPADM

## 2025-02-16 RX ORDER — IOPAMIDOL 755 MG/ML
75 INJECTION, SOLUTION INTRAVASCULAR
Status: COMPLETED | OUTPATIENT
Start: 2025-02-16 | End: 2025-02-16

## 2025-02-16 RX ORDER — POTASSIUM CHLORIDE 7.45 MG/ML
10 INJECTION INTRAVENOUS PRN
Status: DISCONTINUED | OUTPATIENT
Start: 2025-02-16 | End: 2025-02-19 | Stop reason: HOSPADM

## 2025-02-16 RX ORDER — LEVOTHYROXINE SODIUM 50 UG/1
50 TABLET ORAL DAILY
Status: DISCONTINUED | OUTPATIENT
Start: 2025-02-16 | End: 2025-02-19 | Stop reason: HOSPADM

## 2025-02-16 RX ORDER — ONDANSETRON 4 MG/1
4 TABLET, ORALLY DISINTEGRATING ORAL EVERY 8 HOURS PRN
Status: DISCONTINUED | OUTPATIENT
Start: 2025-02-16 | End: 2025-02-19 | Stop reason: HOSPADM

## 2025-02-16 RX ORDER — CLINDAMYCIN PHOSPHATE 600 MG/50ML
600 INJECTION, SOLUTION INTRAVENOUS ONCE
Status: COMPLETED | OUTPATIENT
Start: 2025-02-16 | End: 2025-02-16

## 2025-02-16 RX ORDER — ENOXAPARIN SODIUM 100 MG/ML
30 INJECTION SUBCUTANEOUS 2 TIMES DAILY
Status: DISCONTINUED | OUTPATIENT
Start: 2025-02-16 | End: 2025-02-19 | Stop reason: HOSPADM

## 2025-02-16 RX ORDER — HYDRALAZINE HYDROCHLORIDE 20 MG/ML
10 INJECTION INTRAMUSCULAR; INTRAVENOUS EVERY 6 HOURS PRN
Status: DISCONTINUED | OUTPATIENT
Start: 2025-02-16 | End: 2025-02-19 | Stop reason: HOSPADM

## 2025-02-16 RX ORDER — POLYETHYLENE GLYCOL 3350 17 G/17G
17 POWDER, FOR SOLUTION ORAL DAILY PRN
Status: DISCONTINUED | OUTPATIENT
Start: 2025-02-16 | End: 2025-02-17

## 2025-02-16 RX ORDER — POTASSIUM CHLORIDE 1500 MG/1
40 TABLET, EXTENDED RELEASE ORAL PRN
Status: DISCONTINUED | OUTPATIENT
Start: 2025-02-16 | End: 2025-02-19 | Stop reason: HOSPADM

## 2025-02-16 RX ORDER — ALBUTEROL SULFATE 90 UG/1
2 INHALANT RESPIRATORY (INHALATION) EVERY 6 HOURS PRN
Status: DISCONTINUED | OUTPATIENT
Start: 2025-02-16 | End: 2025-02-19 | Stop reason: HOSPADM

## 2025-02-16 RX ORDER — 0.9 % SODIUM CHLORIDE 0.9 %
500 INTRAVENOUS SOLUTION INTRAVENOUS ONCE
Status: COMPLETED | OUTPATIENT
Start: 2025-02-16 | End: 2025-02-16

## 2025-02-16 RX ORDER — ACETAMINOPHEN 650 MG/1
650 SUPPOSITORY RECTAL EVERY 6 HOURS PRN
Status: DISCONTINUED | OUTPATIENT
Start: 2025-02-16 | End: 2025-02-19 | Stop reason: HOSPADM

## 2025-02-16 RX ORDER — SODIUM CHLORIDE 0.9 % (FLUSH) 0.9 %
5-40 SYRINGE (ML) INJECTION EVERY 12 HOURS SCHEDULED
Status: DISCONTINUED | OUTPATIENT
Start: 2025-02-16 | End: 2025-02-19 | Stop reason: HOSPADM

## 2025-02-16 RX ORDER — MAGNESIUM SULFATE IN WATER 40 MG/ML
2000 INJECTION, SOLUTION INTRAVENOUS PRN
Status: DISCONTINUED | OUTPATIENT
Start: 2025-02-16 | End: 2025-02-19 | Stop reason: HOSPADM

## 2025-02-16 RX ORDER — SODIUM CHLORIDE 9 MG/ML
INJECTION, SOLUTION INTRAVENOUS PRN
Status: DISCONTINUED | OUTPATIENT
Start: 2025-02-16 | End: 2025-02-19 | Stop reason: HOSPADM

## 2025-02-16 RX ORDER — ONDANSETRON 2 MG/ML
4 INJECTION INTRAMUSCULAR; INTRAVENOUS EVERY 6 HOURS PRN
Status: DISCONTINUED | OUTPATIENT
Start: 2025-02-16 | End: 2025-02-19 | Stop reason: HOSPADM

## 2025-02-16 RX ORDER — ACETAMINOPHEN 325 MG/1
650 TABLET ORAL EVERY 6 HOURS PRN
Status: DISCONTINUED | OUTPATIENT
Start: 2025-02-16 | End: 2025-02-19 | Stop reason: HOSPADM

## 2025-02-16 RX ORDER — METOPROLOL SUCCINATE 25 MG/1
25 TABLET, EXTENDED RELEASE ORAL DAILY
Status: DISCONTINUED | OUTPATIENT
Start: 2025-02-17 | End: 2025-02-19 | Stop reason: HOSPADM

## 2025-02-16 RX ORDER — METRONIDAZOLE 500 MG/100ML
500 INJECTION, SOLUTION INTRAVENOUS EVERY 12 HOURS
Status: DISCONTINUED | OUTPATIENT
Start: 2025-02-17 | End: 2025-02-18

## 2025-02-16 RX ORDER — LABETALOL HYDROCHLORIDE 5 MG/ML
10 INJECTION, SOLUTION INTRAVENOUS EVERY 6 HOURS PRN
Status: DISCONTINUED | OUTPATIENT
Start: 2025-02-16 | End: 2025-02-19 | Stop reason: HOSPADM

## 2025-02-16 RX ADMIN — WATER 1000 MG: 1 INJECTION INTRAMUSCULAR; INTRAVENOUS; SUBCUTANEOUS at 10:31

## 2025-02-16 RX ADMIN — MORPHINE SULFATE 4 MG: 4 INJECTION INTRAVENOUS at 18:41

## 2025-02-16 RX ADMIN — PIPERACILLIN AND TAZOBACTAM 3375 MG: 3; .375 INJECTION, POWDER, LYOPHILIZED, FOR SOLUTION INTRAVENOUS at 18:30

## 2025-02-16 RX ADMIN — GUAIFENESIN 600 MG: 600 TABLET ORAL at 20:48

## 2025-02-16 RX ADMIN — CLINDAMYCIN PHOSPHATE 600 MG: 600 INJECTION, SOLUTION INTRAVENOUS at 19:04

## 2025-02-16 RX ADMIN — SODIUM CHLORIDE 500 ML: 9 INJECTION, SOLUTION INTRAVENOUS at 12:22

## 2025-02-16 RX ADMIN — METRONIDAZOLE 500 MG: 500 INJECTION, SOLUTION INTRAVENOUS at 12:24

## 2025-02-16 RX ADMIN — HYDRALAZINE HYDROCHLORIDE 10 MG: 20 INJECTION INTRAMUSCULAR; INTRAVENOUS at 21:40

## 2025-02-16 RX ADMIN — IOPAMIDOL 75 ML: 755 INJECTION, SOLUTION INTRAVENOUS at 10:22

## 2025-02-16 RX ADMIN — SODIUM CHLORIDE 500 ML: 9 INJECTION, SOLUTION INTRAVENOUS at 09:42

## 2025-02-16 RX ADMIN — MORPHINE SULFATE 4 MG: 4 INJECTION INTRAVENOUS at 17:50

## 2025-02-16 RX ADMIN — CEFEPIME 2000 MG: 2 INJECTION, POWDER, FOR SOLUTION INTRAVENOUS at 21:37

## 2025-02-16 ASSESSMENT — LIFESTYLE VARIABLES
HOW MANY STANDARD DRINKS CONTAINING ALCOHOL DO YOU HAVE ON A TYPICAL DAY: PATIENT DOES NOT DRINK
HOW OFTEN DO YOU HAVE A DRINK CONTAINING ALCOHOL: NEVER

## 2025-02-16 ASSESSMENT — PAIN DESCRIPTION - LOCATION
LOCATION: ABDOMEN;RECTUM
LOCATION: ABDOMEN;RECTUM
LOCATION: ABDOMEN
LOCATION: ABDOMEN;RECTUM

## 2025-02-16 ASSESSMENT — PAIN SCALES - GENERAL
PAINLEVEL_OUTOF10: 5
PAINLEVEL_OUTOF10: 7
PAINLEVEL_OUTOF10: 7
PAINLEVEL_OUTOF10: 8
PAINLEVEL_OUTOF10: 0

## 2025-02-16 ASSESSMENT — PAIN - FUNCTIONAL ASSESSMENT
PAIN_FUNCTIONAL_ASSESSMENT: ACTIVITIES ARE NOT PREVENTED
PAIN_FUNCTIONAL_ASSESSMENT: 0-10
PAIN_FUNCTIONAL_ASSESSMENT: ACTIVITIES ARE NOT PREVENTED
PAIN_FUNCTIONAL_ASSESSMENT: 0-10

## 2025-02-16 ASSESSMENT — PAIN DESCRIPTION - FREQUENCY: FREQUENCY: CONTINUOUS

## 2025-02-16 ASSESSMENT — PAIN DESCRIPTION - ORIENTATION: ORIENTATION: ANTERIOR;LOWER;MID

## 2025-02-16 ASSESSMENT — PAIN DESCRIPTION - PAIN TYPE
TYPE: ACUTE PAIN
TYPE: ACUTE PAIN

## 2025-02-16 ASSESSMENT — PAIN DESCRIPTION - DESCRIPTORS
DESCRIPTORS: SHARP
DESCRIPTORS: SHARP

## 2025-02-16 ASSESSMENT — PAIN DESCRIPTION - ONSET: ONSET: ON-GOING

## 2025-02-16 NOTE — ED NOTES
Pt arrives to ED 06 via EMS tx from [place].   Pt co rectal pain and lower abdominal pain since Wednesday.   Pt states that she went to [place] this morning.   Per report, pt has a perirectal abscess and came here for surgery consult.   Per report pt received and CT scan at [place].   Pt states that she is experiencing a lot of pain now.   Pt states that she took all of her daily medications this morning.   Pt respirations are even and unlabored, pt is alert and oriented X 4, speaking in complete sentences, bed is in the lowest position, call light is within reach, NAD noted.   Will continue to follow plan of care.

## 2025-02-16 NOTE — ED PROVIDER NOTES
[facility]     Emergency Department     Faculty Attestation    I performed a history and physical examination of the patient and discussed management with the resident. I reviewed the resident’s note and agree with the documented findings and plan of care. Any areas of disagreement are noted on the chart. I was personally present for the key portions of any procedures. I have documented in the chart those procedures where I was not present during the key portions. I have reviewed the emergency nurses triage note. I agree with the chief complaint, past medical history, past surgical history, allergies, medications, social and family history as documented unless otherwise noted below. Documentation of the HPI, Physical Exam and Medical Decision Making performed by medical students or scribes is based on my personal performance of the HPI, PE and MDM. For Physician Assistant/ Nurse Practitioner cases/documentation I have personally evaluated this patient and have completed at least one if not all key elements of the E/M (history, physical exam, and MDM). Additional findings are as noted.    Vital signs:   Vitals:    02/16/25 1725   BP: (!) 153/94   Pulse: 70   Resp: 20   Temp: 98.4 °F (36.9 °C)   SpO2: 95%      Transfer from [facility] for a perirectal abscess. Patient received Rocephin and Flagyl at [place] as well as IV fluids. Plan to notify general surgery that the patient has arrived.             Natacha Crabtree M.D,  Attending Emergency  Physician            Natacha Crabtree MD  02/16/25 [id number]

## 2025-02-16 NOTE — ED PROVIDER NOTES
[facility]  [facility]  eMERGENCY dEPARTMENT eNCOUnter      Pt Name: Yeimi Montez  MRN: [id number]  Birthdate 1950  Date of evaluation: 2/16/2025  Provider: Jermain Bang MD    CHIEF COMPLAINT       Chief Complaint   Patient presents with    Abdominal Pain     Constipation,m pt reports lower abd pain across lower abd, rectal pain.Feels like there is pressure \" feels like everything is falling out\"      Patient is a 74-year-old female who presents to the emergency department complaining of lower abdominal pain.  Patient states since Wednesday, 4 days ago, she has been having lower abdominal pain.  She denies any vomiting or diarrhea.  She denies any fever or chills.  She states that she has felt constipated and has taken Colace on 2 occasions.  She denies any chest pain or shortness of breath or other symptoms.        Nursing Notes were reviewed.    REVIEW OF SYSTEMS    (2-9 systems for level 4, 10 or more for level 5)     Review of Systems    Except as noted above the remainder of the review of systems was reviewed and negative.       PAST MEDICAL HISTORY     Past Medical History:   Diagnosis Date    Hyperlipidemia     Thyroid disease     Venous ulcer (HCC)     history         SURGICAL HISTORY       Past Surgical History:   Procedure Laterality Date    APPENDECTOMY  09/2018    ruptured appendix    BLADDER SUSPENSION      COLONOSCOPY      HEMORRHOID SURGERY      HYSTERECTOMY (CERVIX STATUS UNKNOWN)      TUBAL LIGATION      VEIN SURGERY      Lt leg \"vein removed\"         ALLERGIES     Patient has no known allergies.    FAMILY HISTORY       Family History   Problem Relation Age of Onset    Stroke Maternal Grandmother     Stroke Paternal Grandmother     No Known Problems Mother     No Known Problems Father           SOCIAL HISTORY       Social History     Socioeconomic History    Marital status:      Spouse name: None    Number of children: None    Years of education: None    Highest education

## 2025-02-16 NOTE — ED PROVIDER NOTES
[facility]O  Emergency Department Encounter  Emergency Medicine Resident     Pt Name:Yeimi Montez  MRN: [id number]  Birthdate 1950  Date of evaluation: 2/16/25  PCP:  Chandler Michele DO  Note Started: 5:30 PM EST      CHIEF COMPLAINT       Chief Complaint   Patient presents with    Rectal Pain    Abdominal Pain       HISTORY OF PRESENT ILLNESS  (Location/Symptom, Timing/Onset, Context/Setting, Quality, Duration, Modifying Factors, Severity.)      Yeimi Montez is a 74 y.o. female who presents as a transfer from [facility] for complaints of perirectal abscess.  Patient has been complaining of constipation as well as rectal pain for the past 5 days.  Patient states that she took Colace and attempts to relieve her constipation, she states it worked for a while, but then her rectal pain got severely worse.  Patient states that she also has been dealing with some foul-smelling discharge from her urethra as well as vagina, she states that it is pink-tinged.  She was complaining of some abdominal pain as well.  She does have a history of bladder sling due to prolapse.  [facility] found to have 6 x 6 x 8 cm abscess in the perirectal region.  She was transferred to [facility] for higher level of care and general surgery evaluation.  On arrival to the ER, patient is complaining of some pain which she rates a 6/10 in intensity in his rectal region.  She denies any chest pain, abdominal pain, nausea or vomiting currently.  She was given Rocephin and Flagyl at [facility] facility.    PAST MEDICAL / SURGICAL / SOCIAL / FAMILY HISTORY      has a past medical history of Hyperlipidemia, Thyroid disease, and Venous ulcer (HCC).       has a past surgical history that includes Hysterectomy; bladder suspension; Tubal ligation; Vein Surgery; Hemorrhoid surgery; Colonoscopy; and Appendectomy (09/2018).      Social History     Socioeconomic History    Marital status:      Spouse name:

## 2025-02-16 NOTE — ED NOTES
ED to inpatient nurses report      Chief Complaint:  Chief Complaint   Patient presents with    Rectal Pain    Abdominal Pain     Present to ED from: tx from [place]    MOA:     LOC: alert and orientated to name, place, date  Mobility: Independent  Oxygen Baseline: 95%    Current needs required: room air   Pending ED orders: IV antibiotics  Present condition: stable    Why did the patient come to the ED? Pt arrives to ED 06 via EMS tx from [place].   Pt co rectal pain and lower abdominal pain since Wednesday.   Pt states that she went to [place] this morning.   Per report, pt has a perirectal abscess and came here for surgery consult.   Per report pt received and CT scan at [place].   Pt states that she is experiencing a lot of pain now.   Pt states that she took all of her daily medications this morning.   Pt respirations are even and unlabored, pt is alert and oriented X 4, speaking in complete sentences, bed is in the lowest position, call light is within reach, NAD noted.   Will continue to follow plan of care.   What is the plan? Admission for surgery  Any procedures or intervention occur? Blood cultures, IV antibiotics, labs, pain meds  Any safety concerns: high fall risk    Mental Status:  Level of Consciousness: Alert (0)    Psych Assessment:   Psychosocial  Psychosocial (WDL): Within Defined Limits  Vital signs   Vitals:    02/16/25 1725   BP: (!) 153/94   Pulse: 70   Resp: 20   Temp: 98.4 °F (36.9 °C)   TempSrc: Oral   SpO2: 95%   Weight: 113.4 kg (250 lb)   Height: 1.702 m (5' 7\")        Vitals:  Patient Vitals for the past 24 hrs:   BP Temp Temp src Pulse Resp SpO2 Height Weight   02/16/25 1725 (!) 153/94 98.4 °F (36.9 °C) Oral 70 20 95 % 1.702 m (5' 7\") 113.4 kg (250 lb)      Visit Vitals  BP (!) 153/94   Pulse 70   Temp 98.4 °F (36.9 °C) (Oral)   Resp 20   Ht 1.702 m (5' 7\")   Wt 113.4 kg (250 lb)   SpO2 95%   BMI 39.16 kg/m²        LDAs:   Peripheral IV 02/16/25 Right Antecubital (Active)

## 2025-02-17 ENCOUNTER — APPOINTMENT (OUTPATIENT)
Dept: CT IMAGING | Age: 75
DRG: 395 | End: 2025-02-17
Payer: MEDICARE

## 2025-02-17 LAB
ANION GAP SERPL CALCULATED.3IONS-SCNC: 10 MMOL/L (ref 9–16)
BASOPHILS # BLD: 0.05 K/UL (ref 0–0.2)
BASOPHILS NFR BLD: 0 % (ref 0–2)
BILIRUB UR QL STRIP: NEGATIVE
BUN SERPL-MCNC: 14 MG/DL (ref 8–23)
CALCIUM SERPL-MCNC: 7.9 MG/DL (ref 8.6–10.4)
CASTS #/AREA URNS LPF: ABNORMAL /LPF (ref 0–2)
CHLORIDE SERPL-SCNC: 103 MMOL/L (ref 98–107)
CLARITY UR: ABNORMAL
CO2 SERPL-SCNC: 21 MMOL/L (ref 20–31)
COLOR UR: YELLOW
CREAT SERPL-MCNC: 0.7 MG/DL (ref 0.6–0.9)
CRP SERPL HS-MCNC: 188 MG/L (ref 0–5)
EOSINOPHIL # BLD: 0.29 K/UL (ref 0–0.44)
EOSINOPHILS RELATIVE PERCENT: 2 % (ref 1–4)
EPI CELLS #/AREA URNS HPF: ABNORMAL /HPF (ref 0–5)
ERYTHROCYTE [DISTWIDTH] IN BLOOD BY AUTOMATED COUNT: 13 % (ref 11.8–14.4)
GFR, ESTIMATED: >90 ML/MIN/1.73M2
GLUCOSE SERPL-MCNC: 115 MG/DL (ref 74–99)
GLUCOSE UR STRIP-MCNC: NEGATIVE MG/DL
HCT VFR BLD AUTO: 34.8 % (ref 36.3–47.1)
HGB BLD-MCNC: 11.5 G/DL (ref 11.9–15.1)
HGB UR QL STRIP.AUTO: NEGATIVE
IMM GRANULOCYTES # BLD AUTO: 0.1 K/UL (ref 0–0.3)
IMM GRANULOCYTES NFR BLD: 1 %
INR PPP: 1.3
KETONES UR STRIP-MCNC: ABNORMAL MG/DL
LEUKOCYTE ESTERASE UR QL STRIP: ABNORMAL
LYMPHOCYTES NFR BLD: 1.34 K/UL (ref 1.1–3.7)
LYMPHOCYTES RELATIVE PERCENT: 8 % (ref 24–43)
MCH RBC QN AUTO: 30.1 PG (ref 25.2–33.5)
MCHC RBC AUTO-ENTMCNC: 33 G/DL (ref 28.4–34.8)
MCV RBC AUTO: 91.1 FL (ref 82.6–102.9)
MICROORGANISM SPEC CULT: NORMAL
MONOCYTES NFR BLD: 1.31 K/UL (ref 0.1–1.2)
MONOCYTES NFR BLD: 8 % (ref 3–12)
NEUTROPHILS NFR BLD: 81 % (ref 36–65)
NEUTS SEG NFR BLD: 14.4 K/UL (ref 1.5–8.1)
NITRITE UR QL STRIP: NEGATIVE
NRBC BLD-RTO: 0 PER 100 WBC
PH UR STRIP: 5 [PH] (ref 5–8)
PLATELET # BLD AUTO: 401 K/UL (ref 138–453)
PMV BLD AUTO: 9.3 FL (ref 8.1–13.5)
POTASSIUM SERPL-SCNC: 4.3 MMOL/L (ref 3.7–5.3)
PROT UR STRIP-MCNC: ABNORMAL MG/DL
PROTHROMBIN TIME: 15.9 SEC (ref 11.7–14.9)
RBC # BLD AUTO: 3.82 M/UL (ref 3.95–5.11)
RBC #/AREA URNS HPF: ABNORMAL /HPF (ref 0–2)
SODIUM SERPL-SCNC: 134 MMOL/L (ref 136–145)
SP GR UR STRIP: 1.03 (ref 1–1.03)
SPECIMEN DESCRIPTION: NORMAL
TSH SERPL DL<=0.05 MIU/L-ACNC: 3.85 UIU/ML (ref 0.27–4.2)
UROBILINOGEN UR STRIP-ACNC: NORMAL EU/DL (ref 0–1)
WBC #/AREA URNS HPF: ABNORMAL /HPF (ref 0–5)
WBC OTHER # BLD: 17.5 K/UL (ref 3.5–11.3)

## 2025-02-17 PROCEDURE — 2709999900 CT ABSCESS DRAINAGE

## 2025-02-17 PROCEDURE — 6370000000 HC RX 637 (ALT 250 FOR IP)

## 2025-02-17 PROCEDURE — 97165 OT EVAL LOW COMPLEX 30 MIN: CPT

## 2025-02-17 PROCEDURE — 36415 COLL VENOUS BLD VENIPUNCTURE: CPT

## 2025-02-17 PROCEDURE — 87070 CULTURE OTHR SPECIMN AEROBIC: CPT

## 2025-02-17 PROCEDURE — 2500000003 HC RX 250 WO HCPCS: Performed by: RADIOLOGY

## 2025-02-17 PROCEDURE — 84443 ASSAY THYROID STIM HORMONE: CPT

## 2025-02-17 PROCEDURE — 86140 C-REACTIVE PROTEIN: CPT

## 2025-02-17 PROCEDURE — 87076 CULTURE ANAEROBE IDENT EACH: CPT

## 2025-02-17 PROCEDURE — 99231 SBSQ HOSP IP/OBS SF/LOW 25: CPT

## 2025-02-17 PROCEDURE — 87185 SC STD ENZYME DETCJ PER NZM: CPT

## 2025-02-17 PROCEDURE — 10060 I&D ABSCESS SIMPLE/SINGLE: CPT

## 2025-02-17 PROCEDURE — 80048 BASIC METABOLIC PNL TOTAL CA: CPT

## 2025-02-17 PROCEDURE — 2500000003 HC RX 250 WO HCPCS

## 2025-02-17 PROCEDURE — 2060000000 HC ICU INTERMEDIATE R&B

## 2025-02-17 PROCEDURE — 97530 THERAPEUTIC ACTIVITIES: CPT

## 2025-02-17 PROCEDURE — 6360000002 HC RX W HCPCS

## 2025-02-17 PROCEDURE — 2580000003 HC RX 258

## 2025-02-17 PROCEDURE — 81001 URINALYSIS AUTO W/SCOPE: CPT

## 2025-02-17 PROCEDURE — 6360000002 HC RX W HCPCS: Performed by: RADIOLOGY

## 2025-02-17 PROCEDURE — 97535 SELF CARE MNGMENT TRAINING: CPT

## 2025-02-17 PROCEDURE — 99222 1ST HOSP IP/OBS MODERATE 55: CPT | Performed by: INTERNAL MEDICINE

## 2025-02-17 PROCEDURE — 87075 CULTR BACTERIA EXCEPT BLOOD: CPT

## 2025-02-17 PROCEDURE — 85610 PROTHROMBIN TIME: CPT

## 2025-02-17 PROCEDURE — 99231 SBSQ HOSP IP/OBS SF/LOW 25: CPT | Performed by: SURGERY

## 2025-02-17 PROCEDURE — 0W9J30Z DRAINAGE OF PELVIC CAVITY WITH DRAINAGE DEVICE, PERCUTANEOUS APPROACH: ICD-10-PCS | Performed by: RADIOLOGY

## 2025-02-17 PROCEDURE — 85025 COMPLETE CBC W/AUTO DIFF WBC: CPT

## 2025-02-17 PROCEDURE — 87086 URINE CULTURE/COLONY COUNT: CPT

## 2025-02-17 PROCEDURE — 87205 SMEAR GRAM STAIN: CPT

## 2025-02-17 RX ORDER — POLYETHYLENE GLYCOL 3350 17 G/17G
17 POWDER, FOR SOLUTION ORAL DAILY
Status: DISCONTINUED | OUTPATIENT
Start: 2025-02-17 | End: 2025-02-18

## 2025-02-17 RX ORDER — MIDAZOLAM HYDROCHLORIDE 2 MG/2ML
INJECTION, SOLUTION INTRAMUSCULAR; INTRAVENOUS PRN
Status: COMPLETED | OUTPATIENT
Start: 2025-02-17 | End: 2025-02-17

## 2025-02-17 RX ORDER — ATORVASTATIN CALCIUM 20 MG/1
10 TABLET, FILM COATED ORAL DAILY
Status: DISCONTINUED | OUTPATIENT
Start: 2025-02-17 | End: 2025-02-19 | Stop reason: HOSPADM

## 2025-02-17 RX ORDER — SODIUM CHLORIDE 0.9 % (FLUSH) 0.9 %
10 SYRINGE (ML) INJECTION EVERY 12 HOURS
Status: DISCONTINUED | OUTPATIENT
Start: 2025-02-17 | End: 2025-02-19 | Stop reason: HOSPADM

## 2025-02-17 RX ORDER — LISINOPRIL 10 MG/1
10 TABLET ORAL DAILY
Status: DISCONTINUED | OUTPATIENT
Start: 2025-02-17 | End: 2025-02-19 | Stop reason: HOSPADM

## 2025-02-17 RX ORDER — SENNA AND DOCUSATE SODIUM 50; 8.6 MG/1; MG/1
2 TABLET, FILM COATED ORAL DAILY
Status: DISCONTINUED | OUTPATIENT
Start: 2025-02-17 | End: 2025-02-18

## 2025-02-17 RX ORDER — SODIUM CHLORIDE, SODIUM LACTATE, POTASSIUM CHLORIDE, CALCIUM CHLORIDE 600; 310; 30; 20 MG/100ML; MG/100ML; MG/100ML; MG/100ML
INJECTION, SOLUTION INTRAVENOUS CONTINUOUS
Status: ACTIVE | OUTPATIENT
Start: 2025-02-17 | End: 2025-02-18

## 2025-02-17 RX ORDER — FENTANYL CITRATE 50 UG/ML
INJECTION, SOLUTION INTRAMUSCULAR; INTRAVENOUS PRN
Status: COMPLETED | OUTPATIENT
Start: 2025-02-17 | End: 2025-02-17

## 2025-02-17 RX ADMIN — HYDROMORPHONE HYDROCHLORIDE 0.25 MG: 1 INJECTION, SOLUTION INTRAMUSCULAR; INTRAVENOUS; SUBCUTANEOUS at 03:50

## 2025-02-17 RX ADMIN — POLYETHYLENE GLYCOL 3350 17 G: 17 POWDER, FOR SOLUTION ORAL at 07:32

## 2025-02-17 RX ADMIN — GUAIFENESIN 600 MG: 600 TABLET ORAL at 22:27

## 2025-02-17 RX ADMIN — METRONIDAZOLE 500 MG: 500 INJECTION, SOLUTION INTRAVENOUS at 14:51

## 2025-02-17 RX ADMIN — LEVOTHYROXINE SODIUM 50 MCG: 0.05 TABLET ORAL at 07:32

## 2025-02-17 RX ADMIN — SODIUM CHLORIDE, PRESERVATIVE FREE 10 ML: 5 INJECTION INTRAVENOUS at 22:07

## 2025-02-17 RX ADMIN — FENTANYL CITRATE 50 MCG: 50 INJECTION, SOLUTION INTRAMUSCULAR; INTRAVENOUS at 13:59

## 2025-02-17 RX ADMIN — OXYBUTYNIN CHLORIDE 10 MG: 10 TABLET, EXTENDED RELEASE ORAL at 07:36

## 2025-02-17 RX ADMIN — SENNOSIDES AND DOCUSATE SODIUM 2 TABLET: 50; 8.6 TABLET ORAL at 07:32

## 2025-02-17 RX ADMIN — MIDAZOLAM HYDROCHLORIDE 1 MG: 1 INJECTION, SOLUTION INTRAMUSCULAR; INTRAVENOUS at 13:59

## 2025-02-17 RX ADMIN — SODIUM CHLORIDE, PRESERVATIVE FREE 10 ML: 5 INJECTION INTRAVENOUS at 22:08

## 2025-02-17 RX ADMIN — LISINOPRIL 10 MG: 10 TABLET ORAL at 15:30

## 2025-02-17 RX ADMIN — Medication 10 MG: at 11:42

## 2025-02-17 RX ADMIN — FENTANYL CITRATE 50 MCG: 50 INJECTION, SOLUTION INTRAMUSCULAR; INTRAVENOUS at 13:50

## 2025-02-17 RX ADMIN — METRONIDAZOLE 500 MG: 500 INJECTION, SOLUTION INTRAVENOUS at 01:48

## 2025-02-17 RX ADMIN — SODIUM CHLORIDE, POTASSIUM CHLORIDE, SODIUM LACTATE AND CALCIUM CHLORIDE: 600; 310; 30; 20 INJECTION, SOLUTION INTRAVENOUS at 15:32

## 2025-02-17 RX ADMIN — MIDAZOLAM HYDROCHLORIDE 1 MG: 1 INJECTION, SOLUTION INTRAMUSCULAR; INTRAVENOUS at 13:50

## 2025-02-17 RX ADMIN — CEFEPIME 2000 MG: 2 INJECTION, POWDER, FOR SOLUTION INTRAVENOUS at 22:30

## 2025-02-17 RX ADMIN — HYDRALAZINE HYDROCHLORIDE 10 MG: 20 INJECTION INTRAMUSCULAR; INTRAVENOUS at 15:39

## 2025-02-17 RX ADMIN — GUAIFENESIN 600 MG: 600 TABLET ORAL at 07:36

## 2025-02-17 RX ADMIN — SODIUM CHLORIDE, PRESERVATIVE FREE 10 ML: 5 INJECTION INTRAVENOUS at 07:46

## 2025-02-17 RX ADMIN — HYDROMORPHONE HYDROCHLORIDE 0.25 MG: 1 INJECTION, SOLUTION INTRAMUSCULAR; INTRAVENOUS; SUBCUTANEOUS at 07:32

## 2025-02-17 RX ADMIN — CEFEPIME 2000 MG: 2 INJECTION, POWDER, FOR SOLUTION INTRAVENOUS at 08:29

## 2025-02-17 RX ADMIN — ATORVASTATIN CALCIUM 10 MG: 20 TABLET, FILM COATED ORAL at 22:27

## 2025-02-17 RX ADMIN — METOPROLOL SUCCINATE 25 MG: 25 TABLET, EXTENDED RELEASE ORAL at 07:32

## 2025-02-17 ASSESSMENT — PAIN DESCRIPTION - DESCRIPTORS
DESCRIPTORS: CRAMPING
DESCRIPTORS: ACHING

## 2025-02-17 ASSESSMENT — PAIN SCALES - GENERAL
PAINLEVEL_OUTOF10: 7
PAINLEVEL_OUTOF10: 8
PAINLEVEL_OUTOF10: 7

## 2025-02-17 ASSESSMENT — PAIN DESCRIPTION - LOCATION
LOCATION: BUTTOCKS
LOCATION: PELVIS;RECTUM

## 2025-02-17 NOTE — CONSULTS
Ramin Berger, Khang, Prabha, Alek, Tee, Theodore, & Danis  Urology Consultation      Patient:  Yeimi Montez  MRN: [id number]  YOB: 1950    CHIEF COMPLAINT: Perirectal abscess abutting bladder    HISTORY OF PRESENT ILLNESS:   The patient is a 74 y.o. female who presents with history of mid urethral sling in 2006.  Patient states that she has been having vaginal discharge since then.  On CT scan patient is noted to have a perirectal abscess abutting the bladder.  However it does not clearly involve the urethra or bladder.  Patient is afebrile and hemodynamically stable at this time.  WBC 17.5 creatinine 0.7.  Urine culture pending.    Patient's old records, notes and chart reviewed and summarized above.    Past Medical History:    Past Medical History:   Diagnosis Date    Hyperlipidemia     Thyroid disease     Venous ulcer (HCC)     history       Past Surgical History:    Past Surgical History:   Procedure Laterality Date    APPENDECTOMY  09/2018    ruptured appendix    BLADDER SUSPENSION      COLONOSCOPY      HEMORRHOID SURGERY      HYSTERECTOMY (CERVIX STATUS UNKNOWN)      TUBAL LIGATION      VEIN SURGERY      Lt leg \"vein removed\"       Medications:      Current Facility-Administered Medications:     HYDROmorphone (DILAUDID) injection 0.25 mg, 0.25 mg, IntraVENous, Q3H PRN, Amos Pelaez MD, 0.25 mg at 02/17/25 0350    atorvastatin (LIPITOR) tablet 10 mg, 10 mg, Oral, Daily, Juanito Keen MD    polyethylene glycol (GLYCOLAX) packet 17 g, 17 g, Oral, Daily, Juanito Keen MD    sennosides-docusate sodium (SENOKOT-S) 8.6-50 MG tablet 2 tablet, 2 tablet, Oral, Daily, Juanito Keen MD    sodium chloride flush 0.9 % injection 5-40 mL, 5-40 mL, IntraVENous, 2 times per day, Juanito Keen MD    sodium chloride flush 0.9 % injection 5-40 mL, 5-40 mL, IntraVENous, PRN, Juanito Keen MD    0.9 % sodium chloride infusion, , IntraVENous, PRN, Juanito Keen MD    potassium chloride (KLOR-CON M)

## 2025-02-17 NOTE — CARE COORDINATION
Case Management Assessment  Initial Evaluation    Date/Time of Evaluation: 2/17/2025 11:14 AM  Assessment Completed by: Chapis Lynn    If patient is discharged prior to next notation, then this note serves as note for discharge by case management.    Patient Name: Yeimi Montez                   YOB: 1950  Diagnosis: Perirectal abscess [K61.1]                   Date / Time: 2/16/2025  5:22 PM    Patient Admission Status: Inpatient   Readmission Risk (Low < 19, Mod (19-27), High > 27): Readmission Risk Score: 7.9    Current PCP: Chandler Michele, DO  PCP verified by CM? Yes (Dr. Michele)    Chart Reviewed: Yes      History Provided by: Patient  Patient Orientation: Alert and Oriented, Person, Place, Situation    Patient Cognition: Alert    Hospitalization in the last 30 days (Readmission):  No    If yes, Readmission Assessment in CM Navigator will be completed.    Advance Directives:      Code Status: Full Code   Patient's Primary Decision Maker is: Legal Next of Kin    Primary Decision Maker: Fernando Montez - Spouse - [phone]    Discharge Planning:    Patient lives with: Spouse/Significant Other Type of Home: House  Primary Care Giver: Self  Patient Support Systems include: Spouse/Significant Other   Current Financial resources: Medicare  Current community resources: None  Current services prior to admission: None            Current DME:              Type of Home Care services:  None    ADLS  Prior functional level: Independent in ADLs/IADLs  Current functional level: Independent in ADLs/IADLs    PT AM-PAC:   /24  OT AM-PAC:   /24    Family can provide assistance at DC: Yes  Would you like Case Management to discuss the discharge plan with any other family members/significant others, and if so, who? Yes (- Fernando)  Plans to Return to Present Housing: Yes  Other Identified Issues/Barriers to RETURNING to current housing: N/A   Potential Assistance needed at discharge: Home Care

## 2025-02-17 NOTE — CONSULTS
General Surgery  Consult    PATIENT NAME: Yeimi Montez  AGE: 74 y.o.  MEDICAL RECORD NO. [id number]  DATE: 2/16/2025  SURGEON: Dr. Contreras  PRIMARY CARE PHYSICIAN: Chandler Michele DO    Patient evaluated at the request of  Dr. Crabtree  Reason for evaluation: Perirectal abscess with concern for colorectal perforation transferred from [place]    Patient information was obtained from patient.  History/Exam limitations: none.  Patient presented to the Emergency Department by ambulance    IMPRESSION:     Patient Active Problem List   Diagnosis    Polyp of colon    Class 3 obesity    Elevated blood pressure reading without diagnosis of hypertension    Mixed hyperlipidemia    Acquired hypothyroidism    Cigarette nicotine dependence without complication    History of arthroplasty of right shoulder       PLAN:   Suspected bladder sling infection with associated abscess.  Recommend IV antibiotics consultation to urology and colorectal surgery.    Plan for IR evaluation for potential drainage tomorrow 2/17/2025  Follow-up cultures  No immediate general surgery intervention at this time.      HISTORY:   History of Chief Complaint:    Yeimi Montez is a 74 y.o. female who presents with abdominal pain and constipation.  Patient states that she developed worsening low pelvic pain 1 week ago.  She had decreased appetite and constipation.  Constipation was mildly alleviated with Colace.  She had nausea without vomiting.  Patient states that she has vaginal discharge at baseline over the past 2 years.  3 weeks ago she noticed a worsening smell and amount of discharge from her vagina.  She describes the drainage as purulent and blood-tinged.  Medical history includes obesity, hyperlipidemia and hypothyroidism.  She does not take any blood thinning medications.  Surgical history includes appendectomy, hysterectomy, tubal ligation, and multiple bladder suspension surgeries with a bladder sling.  Patient states her last

## 2025-02-17 NOTE — PRE SEDATION
Sedation Pre-Procedure Note    Patient Name: Yeimi Montez   YOB: 1950  Room/Bed: 0107/0107-01  Medical Record Number: [id number]  Date: 2/17/2025   Time: 2:37 PM       Indication:  Abscess drain    Consent: I have discussed with the patient and/or the patient representative the indication, alternatives, and the possible risks and/or complications of the planned procedure and the anesthesia methods. The patient and/or patient representative appear to understand and agree to proceed.    Vital Signs:   Vitals:    02/17/25 1421   BP: (!) 202/75   Pulse: 69   Resp:    Temp:    SpO2:        Past Medical History:   has a past medical history of Hyperlipidemia, Thyroid disease, and Venous ulcer (HCC).    Past Surgical History:   has a past surgical history that includes Hysterectomy; bladder suspension; Tubal ligation; Vein Surgery; Hemorrhoid surgery; Colonoscopy; and Appendectomy (09/2018).    Medications:   Scheduled Meds:    atorvastatin  10 mg Oral Daily    polyethylene glycol  17 g Oral Daily    sennosides-docusate sodium  2 tablet Oral Daily    sodium chloride flush  5-40 mL IntraVENous 2 times per day    [Held by provider] enoxaparin  30 mg SubCUTAneous BID    guaiFENesin  600 mg Oral BID    oxyBUTYnin  10 mg Oral Daily    levothyroxine  50 mcg Oral Daily    cefepime  2,000 mg IntraVENous Q12H    metroNIDAZOLE  500 mg IntraVENous Q12H    metoprolol succinate  25 mg Oral Daily     Continuous Infusions:    lactated ringers      sodium chloride       PRN Meds: HYDROmorphone, sodium chloride flush, sodium chloride, potassium chloride **OR** potassium alternative oral replacement **OR** potassium chloride, magnesium sulfate, ondansetron **OR** ondansetron, acetaminophen **OR** acetaminophen, albuterol sulfate HFA, hydrALAZINE, labetalol  Home Meds:   Prior to Admission medications    Medication Sig Start Date End Date Taking? Authorizing Provider   docusate (COLACE) 50 MG/5ML liquid Take 5 mLs by mouth

## 2025-02-17 NOTE — POST SEDATION
Sedation Post Procedure Note    Patient Name: Yeimi Montez   YOB: 1950  Room/Bed: 0107/0107-01  Medical Record Number: [id number]  Date: 2/17/2025   Time: 2:37 PM         Physicians/Assistants: MISHA Grimm    Procedure Performed:  Abscess drain    Post-Sedation Vital Signs:  Vitals:    02/17/25 1421   BP: (!) 202/75   Pulse: 69   Resp:    Temp:    SpO2:       Vital signs were reviewed and were stable after the procedure (see flow sheet for vitals)            Post-Sedation Exam: Pt remains stable            Complications: none    Electronically signed by MISHA Grimm on 2/17/2025 at 2:37 PM

## 2025-02-17 NOTE — H&P
[facility]     Department of Internal Medicine - Staff Internal Medicine Teaching Service          ADMISSION NOTE/HISTORY AND PHYSICAL EXAMINATION   Date: 2/16/2025  Patient Name: Yeimi Montez  Date of admission: 2/16/2025  5:22 PM  YOB: 1950  PCP: Chandler Michele DO  History Obtained From:  patient    CHIEF COMPLAINT     Chief complaint: Rectal pain    HISTORY OF PRESENTING ILLNESS     The patient is a pleasant 74 y.o. female with past medical history significant for:  Hypothyroidism  Hyperlipidemia  Hypertension  Constipation  Multiple surgery for bladder prolapse in the past with bladder sling in place.    Presents with a chief complaint of constipation and pain in the rectal region for the last 5 days.  Per patient she initially thought she felt constipated so took her medication after which she had a bowel movement.  Patient stated that she continued to experience rectal pain and associated abdominal pain.  She also had some discharge from her urethra and vagina.  She describes discharge as white in color and occasionally pink, foul-smelling.  Patient symptoms continue to worsen so she presented to [facility].  Imaging at [facility] revealed large perirectal abscess extending cephalad to the region of rectosigmoid junction measuring 66 x by 68 x 86 mm.  Abscess containing gas, fluid and some feculent debris raising concerns for perforation with underlying acute diverticulitis or colitis with extensive sigmoid colonic diverticulosis.  Abscess also abuts the urinary bladder with urinary bladder thickened.  Labs showed WBC 18.5, urine analysis showing cloudy urine with 3+ leukocyte esterase, ESR 87, INR 1.3, .  Otherwise unremarkable.  General surgery was consulted.  General surgery recommended IR evaluation for potential drainage with consultation to urology and colorectal surgery.  Colorectal surgery was also consulted.  Patient was admitted

## 2025-02-17 NOTE — BRIEF OP NOTE
Brief Postoperative Note    Yeimi Montez  YOB: 1950  [id number]    Pre-operative Diagnosis: Pelvic abscess    Post-operative Diagnosis: Same    Procedure: CT-guided abscess drainage    Anesthesia: Moderate sedation3    Surgeons/Assistants: Haresh Henning MD     Estimated Blood Loss: minimal    Complications: none immediate    Specimens: were obtained    Findings: Successful CT-guided placement of a 10 Fr x 35 cm drain into the pelvic fluid collection using a right transgluteal approach.  Aspiration of 290 mL thick tan purulent-appearing fluid resulting in signficant collapse of the collection.      Electronically signed by Haresh Henning MD on 2/17/2025 at 2:41 PM

## 2025-02-17 NOTE — OR NURSING
10 fr x 35 cm m drain right transgluteal area. Lot [id number]. Sutured and stayfix.  Placed to [facility]. Specimen will be obtained and sent. 290 Ml of very foul smelling tan fluid.

## 2025-02-17 NOTE — CONSULTS
Colorectal Surgery Consult    PATIENT NAME: Yeimi Montez  AGE: 74 y.o.  MEDICAL RECORD NO. [id number]  DATE: 2/16/2025  SURGEON:Dr Mock  PRIMARY CARE PHYSICIAN: Chandler Michele DO    Patient evaluated at the request of  Dr. Crabtree  Reason for evaluation: Perirectal abscess with concern for colorectal perforation transferred from [place]    IMPRESSION:     Patient Active Problem List   Diagnosis    Polyp of colon    Class 3 obesity    Elevated blood pressure reading without diagnosis of hypertension    Mixed hyperlipidemia    Acquired hypothyroidism    Cigarette nicotine dependence without complication    History of arthroplasty of right shoulder    Perirectal abscess       PLAN:   No urgent or emergent colorectal surgery intervention indicated at this time.  Suspected bladder sling infection with associated abscess.  Recommend IV antibiotics consultation to urology.  Plan for IR evaluation for potential drainage tomorrow 2/17/2025. Please hold DVT ppx in anticipation of IR. Will follow-up after evaluation.  Follow-up cultures  Rest of management per primary team  Colorectal surgery will continue to follow      HISTORY:   History of Chief Complaint:    Yeimi Montez is a 74 y.o. female who presents with abdominal pain and constipation.  Patient states that she developed worsening low pelvic pain 1 week ago.  She had decreased appetite and constipation.  Constipation was mildly alleviated with Colace.  She had nausea without vomiting.  Patient states that she has vaginal discharge at baseline over the past 2 years.  3 weeks ago she noticed a worsening smell and amount of discharge from her vagina.  She describes the drainage as purulent and blood-tinged.  Medical history includes obesity, hyperlipidemia and hypothyroidism.  She does not take any blood thinning medications.  Surgical history includes appendectomy, hysterectomy, tubal ligation, and multiple bladder suspension surgeries with a bladder sling.  Patient

## 2025-02-18 LAB
ANION GAP SERPL CALCULATED.3IONS-SCNC: 11 MMOL/L (ref 9–16)
BASOPHILS # BLD: 0.04 K/UL (ref 0–0.2)
BASOPHILS NFR BLD: 0 % (ref 0–2)
BUN SERPL-MCNC: 14 MG/DL (ref 8–23)
CALCIUM SERPL-MCNC: 8.6 MG/DL (ref 8.6–10.4)
CHLORIDE SERPL-SCNC: 106 MMOL/L (ref 98–107)
CO2 SERPL-SCNC: 21 MMOL/L (ref 20–31)
CREAT SERPL-MCNC: 0.7 MG/DL (ref 0.6–0.9)
CRP SERPL HS-MCNC: 210 MG/L (ref 0–5)
EKG ATRIAL RATE: 70 BPM
EKG P AXIS: 64 DEGREES
EKG P-R INTERVAL: 174 MS
EKG Q-T INTERVAL: 418 MS
EKG QRS DURATION: 78 MS
EKG QTC CALCULATION (BAZETT): 451 MS
EKG R AXIS: 13 DEGREES
EKG T AXIS: 63 DEGREES
EKG VENTRICULAR RATE: 70 BPM
EOSINOPHIL # BLD: 0.44 K/UL (ref 0–0.44)
EOSINOPHILS RELATIVE PERCENT: 3 % (ref 1–4)
ERYTHROCYTE [DISTWIDTH] IN BLOOD BY AUTOMATED COUNT: 13 % (ref 11.8–14.4)
GFR, ESTIMATED: >90 ML/MIN/1.73M2
GLUCOSE SERPL-MCNC: 121 MG/DL (ref 74–99)
HCT VFR BLD AUTO: 38.7 % (ref 36.3–47.1)
HGB BLD-MCNC: 12.5 G/DL (ref 11.9–15.1)
IMM GRANULOCYTES # BLD AUTO: 0.12 K/UL (ref 0–0.3)
IMM GRANULOCYTES NFR BLD: 1 %
LYMPHOCYTES NFR BLD: 1.61 K/UL (ref 1.1–3.7)
LYMPHOCYTES RELATIVE PERCENT: 9 % (ref 24–43)
MCH RBC QN AUTO: 29.4 PG (ref 25.2–33.5)
MCHC RBC AUTO-ENTMCNC: 32.3 G/DL (ref 28.4–34.8)
MCV RBC AUTO: 91.1 FL (ref 82.6–102.9)
MICROORGANISM SPEC CULT: NO GROWTH
MONOCYTES NFR BLD: 1.01 K/UL (ref 0.1–1.2)
MONOCYTES NFR BLD: 6 % (ref 3–12)
NEUTROPHILS NFR BLD: 81 % (ref 36–65)
NEUTS SEG NFR BLD: 14.16 K/UL (ref 1.5–8.1)
NRBC BLD-RTO: 0 PER 100 WBC
PLATELET # BLD AUTO: 495 K/UL (ref 138–453)
PMV BLD AUTO: 9.4 FL (ref 8.1–13.5)
POTASSIUM SERPL-SCNC: 4.4 MMOL/L (ref 3.7–5.3)
RBC # BLD AUTO: 4.25 M/UL (ref 3.95–5.11)
SERVICE CMNT-IMP: NORMAL
SODIUM SERPL-SCNC: 138 MMOL/L (ref 136–145)
SPECIMEN DESCRIPTION: NORMAL
WBC OTHER # BLD: 17.4 K/UL (ref 3.5–11.3)

## 2025-02-18 PROCEDURE — 6370000000 HC RX 637 (ALT 250 FOR IP)

## 2025-02-18 PROCEDURE — 99232 SBSQ HOSP IP/OBS MODERATE 35: CPT | Performed by: INTERNAL MEDICINE

## 2025-02-18 PROCEDURE — 6360000002 HC RX W HCPCS

## 2025-02-18 PROCEDURE — 93010 ELECTROCARDIOGRAM REPORT: CPT | Performed by: INTERNAL MEDICINE

## 2025-02-18 PROCEDURE — 86140 C-REACTIVE PROTEIN: CPT

## 2025-02-18 PROCEDURE — 2500000003 HC RX 250 WO HCPCS

## 2025-02-18 PROCEDURE — 2060000000 HC ICU INTERMEDIATE R&B

## 2025-02-18 PROCEDURE — 2580000003 HC RX 258

## 2025-02-18 PROCEDURE — 6360000002 HC RX W HCPCS: Performed by: INTERNAL MEDICINE

## 2025-02-18 PROCEDURE — 80048 BASIC METABOLIC PNL TOTAL CA: CPT

## 2025-02-18 PROCEDURE — 2580000003 HC RX 258: Performed by: INTERNAL MEDICINE

## 2025-02-18 PROCEDURE — 36415 COLL VENOUS BLD VENIPUNCTURE: CPT

## 2025-02-18 PROCEDURE — 85025 COMPLETE CBC W/AUTO DIFF WBC: CPT

## 2025-02-18 PROCEDURE — 2500000003 HC RX 250 WO HCPCS: Performed by: RADIOLOGY

## 2025-02-18 RX ORDER — POLYETHYLENE GLYCOL 3350 17 G/17G
17 POWDER, FOR SOLUTION ORAL DAILY PRN
Status: DISCONTINUED | OUTPATIENT
Start: 2025-02-18 | End: 2025-02-19 | Stop reason: HOSPADM

## 2025-02-18 RX ORDER — SENNA AND DOCUSATE SODIUM 50; 8.6 MG/1; MG/1
1 TABLET, FILM COATED ORAL DAILY PRN
Status: DISCONTINUED | OUTPATIENT
Start: 2025-02-18 | End: 2025-02-19 | Stop reason: HOSPADM

## 2025-02-18 RX ORDER — METRONIDAZOLE 500 MG/100ML
500 INJECTION, SOLUTION INTRAVENOUS EVERY 8 HOURS
Status: DISCONTINUED | OUTPATIENT
Start: 2025-02-18 | End: 2025-02-19 | Stop reason: HOSPADM

## 2025-02-18 RX ADMIN — LISINOPRIL 10 MG: 10 TABLET ORAL at 08:11

## 2025-02-18 RX ADMIN — METRONIDAZOLE 500 MG: 500 INJECTION, SOLUTION INTRAVENOUS at 20:45

## 2025-02-18 RX ADMIN — Medication 10 MG: at 16:41

## 2025-02-18 RX ADMIN — ATORVASTATIN CALCIUM 10 MG: 20 TABLET, FILM COATED ORAL at 08:11

## 2025-02-18 RX ADMIN — OXYBUTYNIN CHLORIDE 10 MG: 10 TABLET, EXTENDED RELEASE ORAL at 08:13

## 2025-02-18 RX ADMIN — CEFEPIME 2000 MG: 2 INJECTION, POWDER, FOR SOLUTION INTRAVENOUS at 16:31

## 2025-02-18 RX ADMIN — ACETAMINOPHEN 650 MG: 325 TABLET ORAL at 16:35

## 2025-02-18 RX ADMIN — SENNOSIDES AND DOCUSATE SODIUM 2 TABLET: 50; 8.6 TABLET ORAL at 08:12

## 2025-02-18 RX ADMIN — POLYETHYLENE GLYCOL 3350 17 G: 17 POWDER, FOR SOLUTION ORAL at 08:11

## 2025-02-18 RX ADMIN — CEFEPIME 2000 MG: 2 INJECTION, POWDER, FOR SOLUTION INTRAVENOUS at 08:18

## 2025-02-18 RX ADMIN — METRONIDAZOLE 500 MG: 500 INJECTION, SOLUTION INTRAVENOUS at 03:34

## 2025-02-18 RX ADMIN — LEVOTHYROXINE SODIUM 50 MCG: 0.05 TABLET ORAL at 08:11

## 2025-02-18 RX ADMIN — SODIUM CHLORIDE, PRESERVATIVE FREE 10 ML: 5 INJECTION INTRAVENOUS at 20:46

## 2025-02-18 RX ADMIN — SODIUM CHLORIDE, PRESERVATIVE FREE 10 ML: 5 INJECTION INTRAVENOUS at 08:13

## 2025-02-18 RX ADMIN — METRONIDAZOLE 500 MG: 500 INJECTION, SOLUTION INTRAVENOUS at 13:07

## 2025-02-18 RX ADMIN — SODIUM CHLORIDE: 0.9 INJECTION, SOLUTION INTRAVENOUS at 03:33

## 2025-02-18 RX ADMIN — SODIUM CHLORIDE, PRESERVATIVE FREE 10 ML: 5 INJECTION INTRAVENOUS at 08:09

## 2025-02-18 RX ADMIN — METOPROLOL SUCCINATE 25 MG: 25 TABLET, EXTENDED RELEASE ORAL at 08:13

## 2025-02-18 RX ADMIN — ENOXAPARIN SODIUM 30 MG: 100 INJECTION SUBCUTANEOUS at 20:46

## 2025-02-18 RX ADMIN — GUAIFENESIN 600 MG: 600 TABLET ORAL at 08:13

## 2025-02-18 RX ADMIN — ACETAMINOPHEN 650 MG: 325 TABLET ORAL at 05:58

## 2025-02-18 ASSESSMENT — PAIN SCALES - GENERAL
PAINLEVEL_OUTOF10: 4
PAINLEVEL_OUTOF10: 4
PAINLEVEL_OUTOF10: 1
PAINLEVEL_OUTOF10: 4
PAINLEVEL_OUTOF10: 4
PAINLEVEL_OUTOF10: 0
PAINLEVEL_OUTOF10: 0

## 2025-02-18 ASSESSMENT — PAIN DESCRIPTION - DESCRIPTORS: DESCRIPTORS: SORE

## 2025-02-18 ASSESSMENT — PAIN DESCRIPTION - ONSET: ONSET: ON-GOING

## 2025-02-18 ASSESSMENT — PAIN DESCRIPTION - PAIN TYPE: TYPE: ACUTE PAIN

## 2025-02-18 ASSESSMENT — PAIN DESCRIPTION - LOCATION
LOCATION: BUTTOCKS
LOCATION: BUTTOCKS

## 2025-02-18 ASSESSMENT — PAIN - FUNCTIONAL ASSESSMENT: PAIN_FUNCTIONAL_ASSESSMENT: PREVENTS OR INTERFERES SOME ACTIVE ACTIVITIES AND ADLS

## 2025-02-18 ASSESSMENT — PAIN DESCRIPTION - ORIENTATION: ORIENTATION: MID

## 2025-02-18 ASSESSMENT — PAIN DESCRIPTION - FREQUENCY: FREQUENCY: CONTINUOUS

## 2025-02-19 VITALS
TEMPERATURE: 98 F | OXYGEN SATURATION: 96 % | HEIGHT: 67 IN | SYSTOLIC BLOOD PRESSURE: 164 MMHG | BODY MASS INDEX: 39.83 KG/M2 | WEIGHT: 253.75 LBS | HEART RATE: 61 BPM | RESPIRATION RATE: 19 BRPM | DIASTOLIC BLOOD PRESSURE: 59 MMHG

## 2025-02-19 LAB
ANION GAP SERPL CALCULATED.3IONS-SCNC: 12 MMOL/L (ref 9–16)
BASOPHILS # BLD: 0.04 K/UL (ref 0–0.2)
BASOPHILS NFR BLD: 0 % (ref 0–2)
BUN SERPL-MCNC: 14 MG/DL (ref 8–23)
CALCIUM SERPL-MCNC: 8.8 MG/DL (ref 8.6–10.4)
CHLORIDE SERPL-SCNC: 106 MMOL/L (ref 98–107)
CO2 SERPL-SCNC: 21 MMOL/L (ref 20–31)
CREAT SERPL-MCNC: 0.6 MG/DL (ref 0.6–0.9)
CRP SERPL HS-MCNC: 130 MG/L (ref 0–5)
EOSINOPHIL # BLD: 0.75 K/UL (ref 0–0.44)
EOSINOPHILS RELATIVE PERCENT: 6 % (ref 1–4)
ERYTHROCYTE [DISTWIDTH] IN BLOOD BY AUTOMATED COUNT: 13.2 % (ref 11.8–14.4)
GFR, ESTIMATED: >90 ML/MIN/1.73M2
GLUCOSE SERPL-MCNC: 113 MG/DL (ref 74–99)
HCT VFR BLD AUTO: 38.6 % (ref 36.3–47.1)
HGB BLD-MCNC: 12.1 G/DL (ref 11.9–15.1)
IMM GRANULOCYTES # BLD AUTO: 0.09 K/UL (ref 0–0.3)
IMM GRANULOCYTES NFR BLD: 1 %
LYMPHOCYTES NFR BLD: 1.85 K/UL (ref 1.1–3.7)
LYMPHOCYTES RELATIVE PERCENT: 14 % (ref 24–43)
MCH RBC QN AUTO: 28.9 PG (ref 25.2–33.5)
MCHC RBC AUTO-ENTMCNC: 31.3 G/DL (ref 28.4–34.8)
MCV RBC AUTO: 92.3 FL (ref 82.6–102.9)
MONOCYTES NFR BLD: 0.81 K/UL (ref 0.1–1.2)
MONOCYTES NFR BLD: 6 % (ref 3–12)
NEUTROPHILS NFR BLD: 73 % (ref 36–65)
NEUTS SEG NFR BLD: 10.21 K/UL (ref 1.5–8.1)
NRBC BLD-RTO: 0 PER 100 WBC
PLATELET # BLD AUTO: 519 K/UL (ref 138–453)
PMV BLD AUTO: 9.5 FL (ref 8.1–13.5)
POTASSIUM SERPL-SCNC: 4.3 MMOL/L (ref 3.7–5.3)
RBC # BLD AUTO: 4.18 M/UL (ref 3.95–5.11)
SODIUM SERPL-SCNC: 139 MMOL/L (ref 136–145)
WBC OTHER # BLD: 13.8 K/UL (ref 3.5–11.3)

## 2025-02-19 PROCEDURE — 85025 COMPLETE CBC W/AUTO DIFF WBC: CPT

## 2025-02-19 PROCEDURE — 6370000000 HC RX 637 (ALT 250 FOR IP)

## 2025-02-19 PROCEDURE — 99232 SBSQ HOSP IP/OBS MODERATE 35: CPT | Performed by: INTERNAL MEDICINE

## 2025-02-19 PROCEDURE — 6360000002 HC RX W HCPCS

## 2025-02-19 PROCEDURE — 2580000003 HC RX 258: Performed by: INTERNAL MEDICINE

## 2025-02-19 PROCEDURE — 6360000002 HC RX W HCPCS: Performed by: INTERNAL MEDICINE

## 2025-02-19 PROCEDURE — 2500000003 HC RX 250 WO HCPCS

## 2025-02-19 PROCEDURE — 2500000003 HC RX 250 WO HCPCS: Performed by: RADIOLOGY

## 2025-02-19 PROCEDURE — 36415 COLL VENOUS BLD VENIPUNCTURE: CPT

## 2025-02-19 PROCEDURE — 86140 C-REACTIVE PROTEIN: CPT

## 2025-02-19 PROCEDURE — 80048 BASIC METABOLIC PNL TOTAL CA: CPT

## 2025-02-19 RX ORDER — LISINOPRIL 10 MG/1
10 TABLET ORAL DAILY
Qty: 30 TABLET | Refills: 3 | Status: SHIPPED | OUTPATIENT
Start: 2025-02-20

## 2025-02-19 RX ORDER — LEVOFLOXACIN 750 MG/1
750 TABLET, FILM COATED ORAL DAILY
Qty: 10 TABLET | Refills: 0 | Status: SHIPPED | OUTPATIENT
Start: 2025-02-19 | End: 2025-03-01

## 2025-02-19 RX ORDER — KETOROLAC TROMETHAMINE 30 MG/ML
30 INJECTION, SOLUTION INTRAMUSCULAR; INTRAVENOUS ONCE
Status: COMPLETED | OUTPATIENT
Start: 2025-02-19 | End: 2025-02-19

## 2025-02-19 RX ORDER — SENNA AND DOCUSATE SODIUM 50; 8.6 MG/1; MG/1
1 TABLET, FILM COATED ORAL DAILY PRN
Qty: 14 TABLET | Refills: 0 | Status: SHIPPED | OUTPATIENT
Start: 2025-02-19 | End: 2025-03-05

## 2025-02-19 RX ORDER — OXYCODONE HYDROCHLORIDE 5 MG/1
5 TABLET ORAL EVERY 6 HOURS PRN
Qty: 12 TABLET | Refills: 0 | Status: SHIPPED | OUTPATIENT
Start: 2025-02-19 | End: 2025-02-22

## 2025-02-19 RX ORDER — METRONIDAZOLE 500 MG/1
500 TABLET ORAL 3 TIMES DAILY
Qty: 30 TABLET | Refills: 0 | Status: SHIPPED | OUTPATIENT
Start: 2025-02-19 | End: 2025-03-01

## 2025-02-19 RX ADMIN — KETOROLAC TROMETHAMINE 30 MG: 30 INJECTION, SOLUTION INTRAMUSCULAR; INTRAVENOUS at 06:13

## 2025-02-19 RX ADMIN — CEFEPIME 2000 MG: 2 INJECTION, POWDER, FOR SOLUTION INTRAVENOUS at 09:28

## 2025-02-19 RX ADMIN — ATORVASTATIN CALCIUM 10 MG: 20 TABLET, FILM COATED ORAL at 09:01

## 2025-02-19 RX ADMIN — OXYBUTYNIN CHLORIDE 10 MG: 10 TABLET, EXTENDED RELEASE ORAL at 09:03

## 2025-02-19 RX ADMIN — LEVOTHYROXINE SODIUM 50 MCG: 0.05 TABLET ORAL at 09:01

## 2025-02-19 RX ADMIN — ACETAMINOPHEN 650 MG: 325 TABLET ORAL at 00:04

## 2025-02-19 RX ADMIN — ENOXAPARIN SODIUM 30 MG: 100 INJECTION SUBCUTANEOUS at 09:01

## 2025-02-19 RX ADMIN — SODIUM CHLORIDE, PRESERVATIVE FREE 10 ML: 5 INJECTION INTRAVENOUS at 09:02

## 2025-02-19 RX ADMIN — LISINOPRIL 10 MG: 10 TABLET ORAL at 09:02

## 2025-02-19 RX ADMIN — CEFEPIME 2000 MG: 2 INJECTION, POWDER, FOR SOLUTION INTRAVENOUS at 01:17

## 2025-02-19 RX ADMIN — GUAIFENESIN 600 MG: 600 TABLET ORAL at 09:03

## 2025-02-19 RX ADMIN — SODIUM CHLORIDE, PRESERVATIVE FREE 10 ML: 5 INJECTION INTRAVENOUS at 09:03

## 2025-02-19 RX ADMIN — HYDROMORPHONE HYDROCHLORIDE 0.25 MG: 1 INJECTION, SOLUTION INTRAMUSCULAR; INTRAVENOUS; SUBCUTANEOUS at 01:13

## 2025-02-19 RX ADMIN — METOPROLOL SUCCINATE 25 MG: 25 TABLET, EXTENDED RELEASE ORAL at 09:01

## 2025-02-19 RX ADMIN — ACETAMINOPHEN 650 MG: 325 TABLET ORAL at 13:50

## 2025-02-19 ASSESSMENT — PAIN DESCRIPTION - DESCRIPTORS
DESCRIPTORS: ACHING;THROBBING
DESCRIPTORS: ACHING;THROBBING
DESCRIPTORS: ACHING;DISCOMFORT

## 2025-02-19 ASSESSMENT — PAIN SCALES - GENERAL
PAINLEVEL_OUTOF10: 6
PAINLEVEL_OUTOF10: 6
PAINLEVEL_OUTOF10: 8
PAINLEVEL_OUTOF10: 4
PAINLEVEL_OUTOF10: 5
PAINLEVEL_OUTOF10: 5

## 2025-02-19 ASSESSMENT — PAIN DESCRIPTION - ORIENTATION
ORIENTATION: RIGHT;LEFT

## 2025-02-19 ASSESSMENT — PAIN DESCRIPTION - PAIN TYPE
TYPE: SURGICAL PAIN

## 2025-02-19 ASSESSMENT — PAIN DESCRIPTION - LOCATION
LOCATION: BUTTOCKS

## 2025-02-19 NOTE — PLAN OF CARE
Problem: Discharge Planning  Goal: Discharge to home or other facility with appropriate resources  Outcome: Progressing  Flowsheets  Taken 2/19/2025 0442  Discharge to home or other facility with appropriate resources:   Identify barriers to discharge with patient and caregiver   Identify discharge learning needs (meds, wound care, etc)  Taken 2/19/2025 0000  Discharge to home or other facility with appropriate resources:   Identify barriers to discharge with patient and caregiver   Identify discharge learning needs (meds, wound care, etc)     Problem: Pain  Goal: Verbalizes/displays adequate comfort level or baseline comfort level  Outcome: Progressing  Flowsheets (Taken 2/19/2025 0002)  Verbalizes/displays adequate comfort level or baseline comfort level:   Assess pain using appropriate pain scale   Encourage patient to monitor pain and request assistance   Administer analgesics based on type and severity of pain and evaluate response   Implement non-pharmacological measures as appropriate and evaluate response   Consider cultural and social influences on pain and pain management   Notify Licensed Independent Practitioner if interventions unsuccessful or patient reports new pain     Problem: Safety - Adult  Goal: Free from fall injury  Outcome: Progressing  Flowsheets  Taken 2/19/2025 0442  Free From Fall Injury: Instruct family/caregiver on patient safety  Taken 2/19/2025 0208  Free From Fall Injury: Instruct family/caregiver on patient safety     Problem: ABCDS Injury Assessment  Goal: Absence of physical injury  Outcome: Progressing  Flowsheets (Taken 2/19/2025 0208)  Absence of Physical Injury: Implement safety measures based on patient assessment

## 2025-02-19 NOTE — PROGRESS NOTES
Physical Therapy Cancel Note      DATE: 2025    NAME: Yeimi Montez  MRN: [id number]   : 1950      Patient not seen this date for Physical Therapy due to:    Patient independent with functional mobility. Will defer PT evaluation at this time. Please reorder PT if future needs arise.  Pt denies PT needs, states she has been getting around in the room independently; educated to be OOB/up ambulating, pt indicated understanding, declined taking a longer walk in the hallway.        Electronically signed by Jorge Kinney PT on 2025 at 9:38 AM      
    Danis Berger, Theodore, Alek Armando, Khang, Tee   Urology Progress Note     Subjective:   No acute events overnight  Afebrile  IR placed perirectal abscess drain yesterday, in place  Cultures pending    Patient Vitals for the past 24 hrs:   BP Temp Temp src Pulse Resp SpO2 Weight   02/18/25 0343 -- -- -- -- -- -- 115 kg (253 lb 8.5 oz)   02/18/25 0340 (!) 150/57 98.4 °F (36.9 °C) Oral 68 21 92 % --   02/18/25 0208 (!) 140/50 -- -- 68 -- -- --   02/18/25 0048 -- -- -- -- 22 -- --   02/17/25 2350 (!) 152/40 98.7 °F (37.1 °C) Oral 67 -- -- --   02/17/25 2300 -- -- -- -- 24 -- --   02/17/25 2001 (!) 144/98 98.5 °F (36.9 °C) Oral 75 26 94 % --   02/17/25 1703 (!) 147/25 -- -- 83 24 93 % --   02/17/25 1537 (!) 178/56 -- -- 75 25 91 % --   02/17/25 1520 (!) 149/80 98 °F (36.7 °C) Oral 90 12 90 % --   02/17/25 1449 (!) 185/63 -- -- 71 24 92 % --   02/17/25 1421 (!) 202/75 -- -- 69 -- -- --   02/17/25 1420 (!) 202/75 -- -- 69 (!) 0 -- --   02/17/25 1415 (!) 201/74 -- -- 71 13 -- --   02/17/25 1410 (!) 201/74 -- -- 71 28 -- --   02/17/25 1405 (!) 201/74 -- -- 72 (!) 31 -- --   02/17/25 1400 (!) 205/70 -- -- 72 (!) 36 95 % --   02/17/25 1355 (!) 205/70 -- -- 72 (!) 35 95 % --   02/17/25 1350 (!) 205/70 -- -- 72 (!) 31 94 % --   02/17/25 1345 (!) 223/80 -- -- 73 30 92 % --   02/17/25 1340 (!) 223/80 -- -- 74 (!) 34 92 % --   02/17/25 1136 (!) 188/41 98.6 °F (37 °C) Oral 71 26 91 % --       Intake/Output Summary (Last 24 hours) at 2/18/2025 0709  Last data filed at 2/18/2025 0657  Gross per 24 hour   Intake 410 ml   Output 1175 ml   Net -765 ml       Recent Labs     02/16/25  0937 02/16/25  1804 02/17/25  0405   WBC 18.5* 16.9* 17.5*   HGB 13.0 12.1 11.5*   HCT 38.7 37.3 34.8*   MCV 88.0 89.9 91.1   * 476* 401     Recent Labs     02/16/25  0937 02/16/25  1804 02/17/25  0405    135* 134*   K 4.2 4.1 4.3    103 103   CO2 22 21 21   BUN 15 14 14   CREATININE 0.9 0.7 0.7       Recent Labs     
    [facility]  Internal Medicine Teaching Residency Program  Inpatient Daily Progress Note  ______________________________________________________________________________    Patient: Yeimi Montez  YOB: 1950   MRN:[id number]    Acct: [id number]     Room: 0107/0107-01  Admit date: 2/16/2025  Today's date: 02/19/25  Number of days in the hospital: 3    SUBJECTIVE   Admitting Diagnosis: Perirectal abscess  CC: Rectal pain  Pt examined at bedside. Chart & results reviewed.   Patient's symptoms improved.  Patient stated that she felt much better today.  Drain output 50 mL.  Hemodynamically stable afebrile.  Patient wanted to be discharged today.  Will discharge on Levaquin and metronidazole.    BRIEF HISTORY     The patient is a pleasant 74 y.o. female with past medical history significant for:  Hypothyroidism  Hyperlipidemia  Hypertension  Constipation  Multiple surgery for bladder prolapse in the past with bladder sling in place.     Presents with a chief complaint of constipation and pain in the rectal region for the last 5 days.  Per patient she initially thought she felt constipated so took her medication after which she had a bowel movement.  Patient stated that she continued to experience rectal pain and associated abdominal pain.  She also had some discharge from her urethra and vagina.  She describes discharge as white in color and occasionally pink, foul-smelling.  Patient symptoms continue to worsen so she presented to [facility].  Imaging at [facility] revealed large perirectal abscess extending cephalad to the region of rectosigmoid junction measuring 66 x by 68 x 86 mm.  Abscess containing gas, fluid and some feculent debris raising concerns for perforation with underlying acute diverticulitis or colitis with extensive sigmoid colonic diverticulosis.  Abscess also abuts the urinary bladder with urinary bladder thickened.  Labs showed WBC 
    PROGRESS NOTE          PATIENT NAME: Yeimi Montez  MEDICAL RECORD NO. [id number]  DATE: 2025  PRIMARY CARE PHYSICIAN: Chandler Michele DO    HD: # 1    ASSESSMENT    Patient Active Problem List   Diagnosis    Polyp of colon    Class 3 obesity    Elevated blood pressure reading without diagnosis of hypertension    Mixed hyperlipidemia    Acquired hypothyroidism    Cigarette nicotine dependence without complication    History of arthroplasty of right shoulder    Perirectal abscess       MEDICAL DECISION MAKING AND PLAN    IR to evaluate for possible drainage   Antibiotics per primary service   No surgical needs indicated at this time   General surgery to sign off       Chief Complaint: \"my butt hurts\"    SUBJECTIVE  Yeimi notes pain in her lower abdomen and \"butt\". She has not had a BM today.      OBJECTIVE  VITALS: Temp: Temp: 98.5 °F (36.9 °C)Temp  Av.9 °F (37.2 °C)  Min: 98.4 °F (36.9 °C)  Max: 99.3 °F (37.4 °C) BP Systolic (24hrs), Av , Min:121 , Max:199   Diastolic (24hrs), Av, Min:17, Max:94   Pulse Pulse  Av.7  Min: 70  Max: 90 Resp Resp  Av.8  Min: 13  Max: 27 Pulse ox SpO2  Av.2 %  Min: 89 %  Max: 96 %  CONSTITUTIONAL: Alert and oriented times 3, no acute distress and cooperative to examination.  HEENT: Head is normocephalic, atraumatic.  NECK: Soft, trachea midline and straight  LUNGS: Chest expands equally bilaterally upon respiration, no accessory muscle used  CARDIOVASCULAR: Regular rate and rhythm  ABDOMEN: Softly distended, tenderness to palpation in the lower abdomen and suprapubic area.  No rebound tenderness.   NEUROLOGIC:  There are no focalizing motor or sensory deficits  EXTREMITIES: no cyanosis, clubbing or edema    No intake/output data recorded.    Drain/tube output:  No intake/output data recorded.    LAB:  CBC:   Recent Labs     25  0937 25  1804 25  0405   WBC 18.5* 16.9* 17.5*   HGB 13.0 12.1 11.5*   HCT 38.7 37.3 34.8*   MCV 88.0 
    [facility]  Internal Medicine Teaching Residency Program  Inpatient Daily Progress Note  ______________________________________________________________________________    Patient: Yeimi Montez  YOB: 1950   MRN:[id number]    Acct: [id number]     Room: 0107/0107-01  Admit date: 2/16/2025  Today's date: 02/18/25  Number of days in the hospital: 2    SUBJECTIVE   Admitting Diagnosis: Perirectal abscess  CC: Rectal pain  Pt examined at bedside. Chart & results reviewed.     Patient states that her pain has improved quite a bit today. Patient wanted to be discharged but informed her that she needs more stay in the hospital.    BRIEF HISTORY     The patient is a pleasant 74 y.o. female with past medical history significant for:  Hypothyroidism  Hyperlipidemia  Hypertension  Constipation  Multiple surgery for bladder prolapse in the past with bladder sling in place.     Presents with a chief complaint of constipation and pain in the rectal region for the last 5 days.  Per patient she initially thought she felt constipated so took her medication after which she had a bowel movement.  Patient stated that she continued to experience rectal pain and associated abdominal pain.  She also had some discharge from her urethra and vagina.  She describes discharge as white in color and occasionally pink, foul-smelling.  Patient symptoms continue to worsen so she presented to [facility].  Imaging at [facility] revealed large perirectal abscess extending cephalad to the region of rectosigmoid junction measuring 66 x by 68 x 86 mm.  Abscess containing gas, fluid and some feculent debris raising concerns for perforation with underlying acute diverticulitis or colitis with extensive sigmoid colonic diverticulosis.  Abscess also abuts the urinary bladder with urinary bladder thickened.  Labs showed WBC 18.5, urine analysis showing cloudy urine with 3+ leukocyte 
  Physician Progress Note      PATIENT:               YARITZA BANSAL  [facility] #:                  [id number]  :                       1950  ADMIT DATE:       2025 5:22 PM  DISCH DATE:  RESPONDING  PROVIDER #:        CHRISTIANE HERNANDEZ          QUERY TEXT:    Pt admitted with Perirectal abscess. Pt noted to have WBC 16.9>17.5>17.4, CRP   178>188>210. . CT guided drain placed with 290ml thick tan purulent fluid   obtained.If possible, please document in the progress notes and discharge   summary if you are evaluating and /or treating any of the following:    The medical record reflects the following:  Risk Factors: Jasmin rectal abscess, age of 74  Clinical Indicators: WBC 16.9>17.5>17.4, >188>210.  Treatment: lab monitoring, wbc, CRP monitoring, CT guided drainage      Thank You Forest AMEZCUA BSN CCDS  Options provided:  -- Sepsis due to perirectal abscess present on admission  -- Jasmin rectal abscess without Sepsis  -- Sepsis was ruled out  -- Other - I will add my own diagnosis  -- Disagree - Not applicable / Not valid  -- Disagree - Clinically unable to determine / Unknown  -- Refer to Clinical Documentation Reviewer    PROVIDER RESPONSE TEXT:    This patient has Perirectal abscess without Sepsis.    Query created by: Marilee Santacruz on 2025 10:31 AM      QUERY TEXT:    Pt admitted with Perirectal abscess. Pt noted to have documented UTI. UA   positive leukocyte 4+ bacteria. urine culture showed no growth . If possible,   please document in the progress notes and discharge summary if you are   evaluating and/or treating any of the following:    The medical record reflects the following:  Risk Factors: age of 74, female  Clinical Indicators: Pt noted to have documented UTI. UA positive leukocyte 4+   bacteria. urine culture showed no growth  Treatment: urine culture, iv cefepime      Thank You Forest AMEZCUA BSN CCDS  Options provided:  -- Urinary Tract Infection despite negative culture  -- UTI ruled out  -- 
CLINICAL PHARMACY NOTE: MEDS TO BEDS    Total # of Prescriptions Filled: 5   The following medications were delivered to the patient:  LISINOPRIL 10MG   FLAGYL 500MG  OXY 5MG   SENEXON   LEVAQUIN 750MG     Additional Documentation:   
General Surgery:  Daily Progress Note      Hospital day 1          PATIENT NAME: Yeimi Montez     TODAY'S DATE: 2/17/2025, 10:24 AM  CC: I feel fine    SUBJECTIVE:     Pt seen and examined at bedside.  Patient was transferred to the hospital yesterday for abdominal pain.  Found to have 8 cm perirectal abscess.  Patient denies fevers, chills, chest pain, shortness of breath, abdominal pain.  Rectal exam performed at bedside and reveals large fluctuant mass in the rectovaginal septum.  Urology following for bladder sling with chronic drainage.  IR placed transgluteal drain today with 290 cc of thick tan purulent fluid.  White count 17.5.  Patient is on clindamycin, Flagyl, Zosyn, Maxipime.    OBJECTIVE:   VITALS:  /80   Pulse 80   Temp 98.5 °F (36.9 °C) (Oral)   Resp 13   Ht 1.702 m (5' 7\")   Wt 115.4 kg (254 lb 6.6 oz)   SpO2 95%   BMI 39.85 kg/m²      INTAKE/OUTPUT:    No intake or output data in the 24 hours ending 02/17/25 1024    PHYSICAL EXAM:  General Appearance: awake, alert, oriented, in no acute distress  HEENT:  Normocephalic, atraumatic, mucus membranes moist   Heart: Heart regular rate and rhythm  Lungs: Breathing unlabored  Abdomen: Soft, nontender, nondistended  Extremities: No cyanosis, pitting edema, rashes noted.      Data:  {LABS:  Recent Results (from the past 24 hour(s))   EKG 12 Lead    Collection Time: 02/16/25  5:57 PM   Result Value Ref Range    Ventricular Rate 70 BPM    Atrial Rate 70 BPM    P-R Interval 174 ms    QRS Duration 78 ms    Q-T Interval 418 ms    QTc Calculation (Bazett) 451 ms    P Axis 64 degrees    R Axis 13 degrees    T Axis 63 degrees   Culture, Blood 2    Collection Time: 02/16/25  6:03 PM    Specimen: Blood   Result Value Ref Range    Specimen Description .BLOOD     Special Requests R HAND     Culture NO GROWTH 12 HOURS    CBC with Auto Differential    Collection Time: 02/16/25  6:04 PM   Result Value Ref Range    WBC 16.9 (H) 3.5 - 11.3 k/uL    RBC 4.15 
General Surgery:  Daily Progress Note      Hospital day 2          PATIENT NAME: Yeimi Montez     TODAY'S DATE: 2/18/2025, 8:17 AM  CC: I feel fine    SUBJECTIVE:     Pt seen and examined at bedside.  Patient underwent IR drainage of perirectal abscess yesterday with 290 cc of tan thick purulent fluid.  White count this morning 17.4.  Denies fevers, chills, chest pain, shortness of breath, abdominal pain.  States she has not had a bowel movement this admission.  Drain output 20 cc overnight of tan fluid.    OBJECTIVE:   VITALS:  BP (!) 147/38   Pulse 68   Temp 98.2 °F (36.8 °C) (Oral)   Resp 22   Ht 1.702 m (5' 7\")   Wt 115 kg (253 lb 8.5 oz)   SpO2 97%   BMI 39.71 kg/m²      INTAKE/OUTPUT:      Intake/Output Summary (Last 24 hours) at 2/18/2025 0817  Last data filed at 2/18/2025 0657  Gross per 24 hour   Intake 410 ml   Output 1175 ml   Net -765 ml       PHYSICAL EXAM:  General Appearance: awake, alert, oriented, in no acute distress  HEENT:  Normocephalic, atraumatic, mucus membranes moist   Heart: Heart regular rate and rhythm  Lungs: Breathing unlabored  Abdomen: Soft, nontender, nondistended  Extremities: No cyanosis, pitting edema, rashes noted.      Data:  {LABS:  Recent Results (from the past 24 hour(s))   Protime-INR    Collection Time: 02/17/25  8:20 AM   Result Value Ref Range    Protime 15.9 (H) 11.7 - 14.9 sec    INR 1.3    Culture, Anaerobic and Aerobic    Collection Time: 02/17/25  3:42 PM    Specimen: Abscess   Result Value Ref Range    Specimen Description .ABSCESS PELVIC DRAIN     Direct Exam MANY NEUTROPHILS     Direct Exam MIXED BACTERIAL MORPHOTYPES SEEN ON GRAM STAIN.     Culture PENDING    Basic Metabolic Panel w/ Reflex to MG    Collection Time: 02/18/25  7:11 AM   Result Value Ref Range    Sodium 138 136 - 145 mmol/L    Potassium 4.4 3.7 - 5.3 mmol/L    Chloride 106 98 - 107 mmol/L    CO2 21 20 - 31 mmol/L    Anion Gap 11 9 - 16 mmol/L    Glucose 121 (H) 74 - 99 mg/dL    BUN 14 8 - 
MCHC 32.3 28.4 - 34.8 g/dL    RDW 13.0 11.8 - 14.4 %    Platelets 495 (H) 138 - 453 k/uL    MPV 9.4 8.1 - 13.5 fL    NRBC Automated 0.0 0.0 per 100 WBC    Neutrophils % 81 (H) 36 - 65 %    Lymphocytes % 9 (L) 24 - 43 %    Monocytes % 6 3 - 12 %    Eosinophils % 3 1 - 4 %    Basophils % 0 0 - 2 %    Immature Granulocytes % 1 (H) 0 %    Neutrophils Absolute 14.16 (H) 1.50 - 8.10 k/uL    Lymphocytes Absolute 1.61 1.10 - 3.70 k/uL    Monocytes Absolute 1.01 0.10 - 1.20 k/uL    Eosinophils Absolute 0.44 0.00 - 0.44 k/uL    Basophils Absolute 0.04 0.00 - 0.20 k/uL    Immature Granulocytes Absolute 0.12 0.00 - 0.30 k/uL         Radiology Review:    CT ABSCESS DRAINAGE    Result Date: 2/17/2025  Successful CT guided placement of a 10 Sammarinese drainage catheter into the pelvic fluid collection using a right transgluteal approach. Please flush this catheter with 5-10 mL of sterile saline every 8-12 hours, and keep track of I/Os.  If output drops below 10 mL consistently for 2-3 days, consideration can be given to removal.     CT ABDOMEN PELVIS W IV CONTRAST Additional Contrast? None    Result Date: 2/16/2025  1. Large perirectal abscess extending cephalad to the region of the rectosigmoid junction measuring 66 x 68 x 86 mm. The abscess contains gas, fluid, and possibly some feculent debris raising consideration for contained colorectal perforation. There is surrounding inflammation. Underlying acute diverticulitis or colitis is suspected with extensive sigmoid colonic diverticulosis. 2. The abscess abuts the posterior aspect of the urinary bladder. The bladder wall is thickened, possibly cystitis. No gas is seen within the lumen of the bladder to suggest clear fistula at this time. The abscess also abuts the vagina and fistula formation with the vagina is not excluded. Correlation with physical exam findings is recommended. 3. Hepatic steatosis, cholelithiasis, nonobstructing renal stones.          ASSESSMENT:  Active Hospital 
Independent;Based on clinical judgement    Balance  Balance  Sitting: Without support (Pt demonstrated static and dynamic sitting at EOB independently, Pt completed ROM, MMT, and LB dressing. total time sitting ~20 min)  Standing: Without support (Pt completed static and dynamaic standing independently. Pt demonstrated good balance while in stance. Pt completed oral care and facial hygiene standing sinkside. total time standing ~ 7 min)  Additional Comments  Additional Comments: Pt demonstrated balance while in stance by standing on each foot for 10 sec.    Transfers/Mobility  Bed mobility  Bed Mobility Comments: Pt seated EOB upon entry and exit.          Transfers  Sit to stand: Independent  Stand to sit: Independent  Transfer Comments: Pt demonstrated functional transfers from the bed to standing independently with no use of AE. Pt demonstrated proper hand placement for transfers.         Functional Mobility: Modified independent ;Increased time to complete  Functional Mobility Skilled Clinical Factors: Pt demonstrated functional mobility from bed to bathroom with no use of AE independently simulating household distances.       Patient Education  Patient Education  Education Given To: Patient  Education Provided: Role of Therapy;Plan of Care;ADL Adaptive Strategies  Education Provided Comments: Pt educated on role of OT, POC, ADL adaptive strategies, pursed lip breathing, proper hand placment for transfers, and gait belt safety.  Education Method: Verbal  Barriers to Learning: None  Education Outcome: Verbalized understanding    Plan  Occupational Therapy Plan  Times Per Week: eval only    Minutes  OT Individual Minutes  Time In: 1503  Time Out: 1535  Minutes: 32  Time Code Minutes   Timed Code Treatment Minutes: 30 Minutes    Electronically signed by LAKIA Graham on 2/17/25 at 4:22 PM EST   
transcription, some errors in transcription may have occurred.

## 2025-02-19 NOTE — DISCHARGE INSTRUCTIONS
You are admitted for a perirectal abscess and were seen colorectal surgeon.  You underwent drainage with drain placement of the abscess on 2/17     You are being discharged on Levaquin and Flagyl for your abscess.  Please take for the next 10 days  Please call and schedule a follow-up appointment with colorectal surgery in 2 weeks for drain removal  Please call and schedule a follow-up appoint with urology  You have also been started on lisinopril 10 mg for high blood pressure.  You need to follow-up with your PCP regarding further response to blood pressure medications   You also being discharged on oxycodone for pain.  Please take every 6 hours as needed for pain.  Please do not drive, operate heavy machinery, swim, or travel to heights on this medication can cause drowsiness, lethargy    Please follow-up with your primary care provider within 5 to 7 days for continued care.   If you have been given medication please take them as prescribed. Do not take more medication than recommended at any given time.   If you begin to experience any symptoms such as chest pain shortness of breath nausea vomiting dizziness drowsiness abdominal pain loss of consciousness or any other symptoms you find concerning please return to the ED for follow-up evaluation.  Please feel free return to the hospital if your symptoms worsen or any new concerning symptoms develop.  Follow-up with your primary care physician as needed for all other the concerns.

## 2025-02-19 NOTE — DISCHARGE INSTR - COC
Signs: BP (!) 166/63   Pulse 61   Temp 97.5 °F (36.4 °C) (Oral)   Resp 21   Ht 1.702 m (5' 7\")   Wt 115.1 kg (253 lb 12 oz)   SpO2 96%   BMI 39.74 kg/m²     Last documented pain score (0-10 scale): Pain Level: 5  Last Weight:   Wt Readings from Last 1 Encounters:   02/19/25 115.1 kg (253 lb 12 oz)     Mental Status:  {IP PT MENTAL STATUS:20030}    IV Access:  { MICHELLE IV ACCESS:131835463}    Nursing Mobility/ADLs:  Walking   {CHP DME ADLs:423363143}  Transfer  {CHP DME ADLs:230884119}  Bathing  {CHP DME ADLs:522308446}  Dressing  {CHP DME ADLs:143089110}  Toileting  {CHP DME ADLs:651739509}  Feeding  {CHP DME ADLs:080287787}  Med Admin  {CHP DME ADLs:709248118}  Med Delivery   { MICHELLE MED Delivery:616274912}    Wound Care Documentation and Therapy:        Elimination:  Continence:   Bowel: {YES / NO:19727}  Bladder: {YES / NO:19727}  Urinary Catheter: {Urinary Catheter:751270001}   Colostomy/Ileostomy/Ileal Conduit: {YES / NO:19727}       Date of Last BM: ***    Intake/Output Summary (Last 24 hours) at 2/19/2025 1216  Last data filed at 2/19/2025 0530  Gross per 24 hour   Intake 874.27 ml   Output 640 ml   Net 234.27 ml     I/O last 3 completed shifts:  In: 1584.3 [P.O.:1490; I.V.:44.3; IV Piggyback:50]  Out: 1475 [Urine:1400; Drains:75]    Safety Concerns:     { MICHELLE Safety Concerns:881847230}    Impairments/Disabilities:      { MICHELLE Impairments/Disabilities:040704461}    Nutrition Therapy:  Current Nutrition Therapy:   { MICHELLE Diet List:888197497}    Routes of Feeding: {CHP DME Other Feedings:780183072}  Liquids: {Slp liquid thickness:29302}  Daily Fluid Restriction: {CHP DME Yes amt example:808622231}  Last Modified Barium Swallow with Video (Video Swallowing Test): {Done Not Done Date:304088012}    Treatments at the Time of Hospital Discharge:   Respiratory Treatments: ***  Oxygen Therapy:  {Therapy; copd oxygen:35654}  Ventilator:    { CC Vent List:365478971}    Rehab Therapies: {THERAPEUTIC

## 2025-02-20 LAB
MICROORGANISM SPEC CULT: ABNORMAL
MICROORGANISM SPEC CULT: ABNORMAL
MICROORGANISM/AGENT SPEC: ABNORMAL
MICROORGANISM/AGENT SPEC: ABNORMAL
SPECIMEN DESCRIPTION: ABNORMAL

## 2025-02-21 LAB
MICROORGANISM SPEC CULT: NORMAL
MICROORGANISM SPEC CULT: NORMAL
SERVICE CMNT-IMP: NORMAL
SERVICE CMNT-IMP: NORMAL
SPECIMEN DESCRIPTION: NORMAL
SPECIMEN DESCRIPTION: NORMAL

## 2025-02-25 ENCOUNTER — OFFICE VISIT (OUTPATIENT)
Dept: FAMILY MEDICINE CLINIC | Age: 75
End: 2025-02-25

## 2025-02-25 VITALS
DIASTOLIC BLOOD PRESSURE: 78 MMHG | HEART RATE: 73 BPM | OXYGEN SATURATION: 93 % | BODY MASS INDEX: 39.78 KG/M2 | SYSTOLIC BLOOD PRESSURE: 120 MMHG | WEIGHT: 254 LBS

## 2025-02-25 DIAGNOSIS — Z09 HOSPITAL DISCHARGE FOLLOW-UP: Primary | ICD-10-CM

## 2025-02-25 DIAGNOSIS — Z87.440 HISTORY OF URINARY TRACT INFECTION: ICD-10-CM

## 2025-02-25 DIAGNOSIS — Z87.19 HISTORY OF RECTAL ABSCESS: ICD-10-CM

## 2025-02-25 ASSESSMENT — PATIENT HEALTH QUESTIONNAIRE - PHQ9
2. FEELING DOWN, DEPRESSED OR HOPELESS: NOT AT ALL
SUM OF ALL RESPONSES TO PHQ QUESTIONS 1-9: 0
1. LITTLE INTEREST OR PLEASURE IN DOING THINGS: NOT AT ALL
SUM OF ALL RESPONSES TO PHQ9 QUESTIONS 1 & 2: 0

## 2025-02-25 ASSESSMENT — ENCOUNTER SYMPTOMS
WHEEZING: 0
RHINORRHEA: 0
NAUSEA: 0
VOMITING: 0
DIARRHEA: 0
BACK PAIN: 0
SINUS PAIN: 0
COUGH: 0
ABDOMINAL PAIN: 0

## 2025-02-25 NOTE — PROGRESS NOTES
HPI Notes    Name: Yeimi Montez  : 1950         Chief Complaint:     Chief Complaint   Patient presents with    ED Follow-up     Patient was admitted to hospital on  to treat Uti and perirectal abscess. She will have drain removed on . General surgery recommended IR evaluation for potential drainage with consultation to urology and colorectal surgery.       History of Present Illness:      HPI    This is a 74-year-old woman presenting for a hospital discharge follow-up.  She was admitted on 2025 for a urinary tract infection, and perirectal abscess.  I did review the the emergency room and hospital records, which are unfortunately rather extensive.  She had a 6.6 x 6.8 x 8.6 cm abscess containing gas, purulent and feculent debris.  General surgery, colorectal surgery and urology were all consulted. The CHELSEA drain is in place draining a small amount of serous fluid and she has no major pain. She is urinating and defecating without difficulty and has an appt with surgery on 3/3 to remove the CHELSEA drain.     Past Medical History:     Past Medical History:   Diagnosis Date    Hyperlipidemia     Thyroid disease     Venous ulcer (HCC)     history      Reviewed all health maintenance requirements and ordered appropriate tests  Health Maintenance Due   Topic Date Due    Shingles vaccine (1 of 2) Never done    Respiratory Syncytial Virus (RSV) Pregnant or age 60 yrs+ (1 - Risk 60-74 years 1-dose series) Never done    Breast cancer screen  2021    DTaP/Tdap/Td vaccine (2 - Td or Tdap) 06/10/2023    Flu vaccine (1) Never done    COVID-19 Vaccine ( -  season) Never done    Lung Cancer Screening &/or Counseling  2024       Past Surgical History:     Past Surgical History:   Procedure Laterality Date    APPENDECTOMY  2018    ruptured appendix    BLADDER SUSPENSION      COLONOSCOPY      CT ABSCESS DRAINAGE  2025    CT ABSCESS DRAINAGE 2025 Haresh Henning MD [facility] CT

## 2025-02-25 NOTE — PATIENT INSTRUCTIONS
SURVEY:    You may be receiving a survey from [facility] regarding your visit today.    You may get this in the mail, through your MyChart or in your email.     Please complete the survey to enable us to provide the highest quality of care to you and your family.    If you cannot score us as very good ( 5 Stars) on any question, please feel free to call the office to discuss how we could have made your experience exceptional.     Thank you.    Clinical Care Team:  Dr. Chandler Michele, DO Symone Gabriel LPN    Triage:  Nicole Ojeda CMA    Clerical Team:  Nicole Llamas

## 2025-03-17 RX ORDER — METOPROLOL SUCCINATE 25 MG/1
25 TABLET, EXTENDED RELEASE ORAL DAILY
Qty: 90 TABLET | Refills: 1 | Status: SHIPPED | OUTPATIENT
Start: 2025-03-17

## 2025-03-31 DIAGNOSIS — N39.492 POSTURAL URINARY INCONTINENCE: ICD-10-CM

## 2025-03-31 RX ORDER — OXYBUTYNIN CHLORIDE 10 MG/1
10 TABLET, EXTENDED RELEASE ORAL DAILY
Qty: 90 TABLET | Refills: 1 | Status: SHIPPED | OUTPATIENT
Start: 2025-03-31

## 2025-03-31 NOTE — TELEPHONE ENCOUNTER
Medication Refill Request     Oxybutynin 10 mg     [facility] [place]     Patient also has had diarrhea since Friday 3/28/25.  She is wondering if Dr. Michele could call her something in to help.  Patient states she is scheduled for a colonoscopy next month.    Last seen 2/25/25  Next appt 4/21/25    Health Maintenance   Topic Date Due    Shingles vaccine (1 of 2) Never done    Breast cancer screen  09/04/2021    DTaP/Tdap/Td vaccine (2 - Td or Tdap) 06/10/2023    Flu vaccine (1) Never done    COVID-19 Vaccine (1 - 2024-25 season) Never done    Lung Cancer Screening &/or Counseling  09/27/2024    Lipids  08/26/2025    Annual Wellness Visit (Medicare)  10/22/2025    Depression Screen  02/25/2026    Respiratory Syncytial Virus (RSV) Pregnant or age 60 yrs+ (1 - 1-dose 75+ series) 10/11/2026    Colorectal Cancer Screen  09/08/2028    DEXA (modify frequency per FRAX score)  Completed    Pneumococcal 50+ years Vaccine  Completed    Hepatitis A vaccine  Aged Out    Hepatitis B vaccine  Aged Out    Hib vaccine  Aged Out    Polio vaccine  Aged Out    Meningococcal (ACWY) vaccine  Aged Out    Meningococcal B vaccine  Aged Out    A1C test (Diabetic or Prediabetic)  Discontinued    Diabetes screen  Discontinued    Hepatitis C screen  Discontinued             (applicable per patient's age: Cancer Screenings, Depression Screening, Fall Risk Screening, Immunizations)    Hemoglobin A1C (%)   Date Value   02/26/2024 5.6   07/27/2023 5.9     AST (U/L)   Date Value   02/16/2025 16     ALT (U/L)   Date Value   02/16/2025 22     BUN (mg/dL)   Date Value   02/19/2025 14      (goal A1C is < 7)   (goal LDL is <100) need 30-50% reduction from baseline     BP Readings from Last 3 Encounters:   02/25/25 120/78   02/19/25 (!) 164/59   02/16/25 (!) 149/64    (goal /80)      All Future Testing planned in [facility]:  Lab Frequency Next Occurrence   TSH with Reflex Once 04/16/2025   CBC with Auto Differential Once 04/16/2025   Comprehensive

## 2025-03-31 NOTE — TELEPHONE ENCOUNTER
Last OV: 2/25/2025  HFU  rectal abscess  Last RX:    Next scheduled apt: 4/21/2025  6 months         Pt requesting a refill   Surescript requesting a refill          Patient also has had diarrhea since Friday 3/28/25. She is wondering if Dr. Michele could call her something in to help. Patient states she is scheduled for a colonoscopy next month.

## 2025-04-07 ENCOUNTER — TELEPHONE (OUTPATIENT)
Dept: PHARMACY | Facility: CLINIC | Age: 75
End: 2025-04-07

## 2025-04-07 NOTE — TELEPHONE ENCOUNTER
[facility] CLINICAL PHARMACY: ADHERENCE REVIEW  Identified care gap per [place]: fills at [facility] : ACE/ARB adherence    ASSESSMENT    ACE/ARB ADHERENCE    Insurance Records claims through 3/31/25 (Prior Year PDC = not reported; YTD PDC = FIRST FILL; Potential Fail Date: 25):   LISINOPRIL 10 MG TABLET last filled on 25 for 30 day supply. Next refill due: 25    Prescribed si tablet/capsule daily    Per Insurer Portal: last filled on 25 for 30 day supply.     Per [facility]: not contacted    BP Readings from Last 3 Encounters:   25 120/78   25 (!) 164/59   25 (!) 149/64     Estimated Creatinine Clearance: 109 mL/min (based on SCr of 0.6 mg/dL).  Lab Results   Component Value Date    CREATININE 0.6 2025     Lab Results   Component Value Date    K 4.3 2025       PLAN    The following are interventions that have been identified:   Patient OVERDUE refilling LISINOPRIL 10 MG TABLET and active on home medication list.   Patient eligible for 100 day supply of LISINOPRIL 10 MG TABLET    Attempting to reach patient to review.  Left message asking for return call. [email or web address] message sent to patient.    Last Visit: 25  Next Visit: 25    Bree Venegas [facility]   [facility]  [phone] Option 1

## 2025-04-28 DIAGNOSIS — E78.2 MIXED HYPERLIPIDEMIA: ICD-10-CM

## 2025-04-28 DIAGNOSIS — G89.29 CHRONIC RIGHT-SIDED LOW BACK PAIN WITHOUT SCIATICA: ICD-10-CM

## 2025-04-28 DIAGNOSIS — M54.50 CHRONIC RIGHT-SIDED LOW BACK PAIN WITHOUT SCIATICA: ICD-10-CM

## 2025-04-28 DIAGNOSIS — N39.492 POSTURAL URINARY INCONTINENCE: ICD-10-CM

## 2025-04-28 RX ORDER — METOPROLOL SUCCINATE 25 MG/1
25 TABLET, EXTENDED RELEASE ORAL DAILY
Qty: 90 TABLET | Refills: 3 | Status: SHIPPED | OUTPATIENT
Start: 2025-04-28

## 2025-04-28 RX ORDER — OXYBUTYNIN CHLORIDE 10 MG/1
10 TABLET, EXTENDED RELEASE ORAL DAILY
Qty: 90 TABLET | Refills: 1 | Status: SHIPPED | OUTPATIENT
Start: 2025-04-28 | End: 2025-10-25

## 2025-04-28 RX ORDER — LEVOTHYROXINE SODIUM 50 UG/1
50 TABLET ORAL DAILY
Qty: 90 TABLET | Refills: 0 | Status: SHIPPED | OUTPATIENT
Start: 2025-04-28 | End: 2025-07-27

## 2025-04-28 RX ORDER — ATORVASTATIN CALCIUM 10 MG/1
10 TABLET, FILM COATED ORAL DAILY
Qty: 90 TABLET | Refills: 1 | Status: SHIPPED | OUTPATIENT
Start: 2025-04-28 | End: 2025-10-25

## 2025-04-28 NOTE — TELEPHONE ENCOUNTER
Patient asking for scripts to go to her mail order pharmacy - patient unsure who that is so I asked her to call to find that out for us - needs     Levothyroxine  Diclofenac  Oxybutynin  Atorvastatin    Patient using [facility]

## 2025-08-25 DIAGNOSIS — M54.50 CHRONIC RIGHT-SIDED LOW BACK PAIN WITHOUT SCIATICA: Primary | ICD-10-CM

## 2025-08-25 DIAGNOSIS — G89.29 CHRONIC RIGHT-SIDED LOW BACK PAIN WITHOUT SCIATICA: Primary | ICD-10-CM
